# Patient Record
Sex: FEMALE | Race: WHITE | NOT HISPANIC OR LATINO | Employment: OTHER | URBAN - METROPOLITAN AREA
[De-identification: names, ages, dates, MRNs, and addresses within clinical notes are randomized per-mention and may not be internally consistent; named-entity substitution may affect disease eponyms.]

---

## 2023-10-03 ENCOUNTER — NURSING HOME VISIT (OUTPATIENT)
Dept: POST ACUTE CARE | Facility: EXTERNAL LOCATION | Age: 88
End: 2023-10-03
Payer: MEDICARE

## 2023-10-03 DIAGNOSIS — E03.9 HYPOTHYROIDISM (ACQUIRED): ICD-10-CM

## 2023-10-03 DIAGNOSIS — I10 ESSENTIAL HYPERTENSION: ICD-10-CM

## 2023-10-03 DIAGNOSIS — I50.32 CHRONIC HEART FAILURE WITH PRESERVED EJECTION FRACTION (HFPEF) (MULTI): Primary | ICD-10-CM

## 2023-10-03 DIAGNOSIS — K21.9 GASTROESOPHAGEAL REFLUX DISEASE WITHOUT ESOPHAGITIS: ICD-10-CM

## 2023-10-03 DIAGNOSIS — F41.9 ANXIETY: ICD-10-CM

## 2023-10-03 DIAGNOSIS — F03.90 DEMENTIA WITHOUT BEHAVIORAL DISTURBANCE (MULTI): ICD-10-CM

## 2023-10-03 DIAGNOSIS — E78.2 MIXED HYPERLIPIDEMIA: ICD-10-CM

## 2023-10-03 PROCEDURE — 99308 SBSQ NF CARE LOW MDM 20: CPT | Performed by: INTERNAL MEDICINE

## 2023-10-03 NOTE — LETTER
Patient: Jailyn Dai  : 1930    Encounter Date: 10/03/2023    Subjective  Patient ID: Jailyn Dai is a 89 y.o. female who is long term resident being seen and evaluated for multiple medical problems.    HPI She is c/o dry cough, deneis SOB    Review of Systems   Constitutional:  Negative for fever and unexpected weight change.   HENT:  Negative for sore throat.    Respiratory:  Positive for cough. Negative for shortness of breath.    Cardiovascular:  Negative for chest pain and palpitations.   Gastrointestinal:  Negative for abdominal pain, constipation, diarrhea, nausea and vomiting.   Genitourinary:  Negative for difficulty urinating and frequency.   Musculoskeletal:  Positive for arthralgias. Negative for back pain.   Skin:  Negative for rash.   Neurological:  Negative for dizziness, seizures and headaches.   Psychiatric/Behavioral:  Negative for agitation. The patient is not nervous/anxious.        Objective  There were no vitals taken for this visit.    Physical Exam  Vitals reviewed.   Constitutional:       General: She is not in acute distress.  HENT:      Mouth/Throat:      Mouth: Mucous membranes are moist.   Cardiovascular:      Rate and Rhythm: Regular rhythm.      Heart sounds: Normal heart sounds.   Pulmonary:      Breath sounds: Normal breath sounds. No rales.   Abdominal:      Palpations: Abdomen is soft.      Tenderness: There is no abdominal tenderness.   Musculoskeletal:         General: No tenderness.      Cervical back: Neck supple. No tenderness.      Right lower leg: Edema present.      Left lower leg: Edema present.   Skin:     General: Skin is warm.   Neurological:      General: No focal deficit present.      Mental Status: She is alert.   Psychiatric:         Behavior: Behavior normal.         Assessment/Plan         Goals    None           Electronically Signed By: Swetha Garcia MD   10/17/23  9:20 AM

## 2023-10-10 ENCOUNTER — NURSING HOME VISIT (OUTPATIENT)
Dept: POST ACUTE CARE | Facility: EXTERNAL LOCATION | Age: 88
End: 2023-10-10
Payer: MEDICARE

## 2023-10-10 DIAGNOSIS — I10 ESSENTIAL HYPERTENSION: ICD-10-CM

## 2023-10-10 DIAGNOSIS — Z86.79 HISTORY OF ATRIAL FIBRILLATION: ICD-10-CM

## 2023-10-10 DIAGNOSIS — I50.32 CHRONIC HEART FAILURE WITH PRESERVED EJECTION FRACTION (HFPEF) (MULTI): Primary | ICD-10-CM

## 2023-10-10 DIAGNOSIS — M15.9 PRIMARY OSTEOARTHRITIS INVOLVING MULTIPLE JOINTS: ICD-10-CM

## 2023-10-10 PROCEDURE — 99308 SBSQ NF CARE LOW MDM 20: CPT | Performed by: INTERNAL MEDICINE

## 2023-10-10 NOTE — LETTER
Patient: Jailyn Dai  : 1930    Encounter Date: 10/10/2023    Subjective  Patient ID: Jailyn Dai is a 89 y.o. female who is long term resident being seen and evaluated for multiple medical problems.    HPI     Review of Systems   Constitutional:  Negative for fever and unexpected weight change.   HENT:  Negative for sore throat.    Respiratory:  Negative for cough and shortness of breath.    Cardiovascular:  Negative for chest pain and palpitations.   Gastrointestinal:  Negative for abdominal pain, constipation, diarrhea, nausea and vomiting.   Genitourinary:  Negative for difficulty urinating and frequency.   Musculoskeletal:  Positive for arthralgias. Negative for back pain.   Skin:  Negative for rash.   Neurological:  Negative for dizziness, seizures and headaches.   Psychiatric/Behavioral:  Negative for agitation. The patient is not nervous/anxious.        Objective  There were no vitals taken for this visit.    Physical Exam  Cardiovascular:      Rate and Rhythm: Normal rate and regular rhythm.   Pulmonary:      Breath sounds: Normal breath sounds.   Abdominal:      General: Bowel sounds are normal.      Palpations: Abdomen is soft.   Musculoskeletal:         General: No swelling.   Neurological:      Mental Status: She is alert.         Assessment/Plan  Problem List Items Addressed This Visit       Chronic heart failure with preserved ejection fraction (HFpEF) (CMS/[facility]) - Primary    Essential hypertension     Will monitor BP         History of atrial fibrillation    Osteoarthritis     Continue fall precautions             Goals    None           Electronically Signed By: Swetha Garcia MD   23  6:52 AM

## 2023-10-14 PROBLEM — F41.9 ANXIETY: Status: ACTIVE | Noted: 2023-10-14

## 2023-10-17 PROBLEM — M54.16 LUMBAR RADICULOPATHY: Status: ACTIVE | Noted: 2023-10-17

## 2023-10-17 PROBLEM — E78.2 MIXED HYPERLIPIDEMIA: Status: ACTIVE | Noted: 2023-10-17

## 2023-10-17 PROBLEM — M87.052 AVASCULAR NECROSIS OF BONE OF LEFT HIP (MULTI): Status: ACTIVE | Noted: 2023-10-17

## 2023-10-17 PROBLEM — M19.90 OSTEOARTHRITIS: Status: ACTIVE | Noted: 2023-10-17

## 2023-10-17 PROBLEM — I25.10 ATHEROSCLEROTIC HEART DISEASE OF NATIVE CORONARY ARTERY WITHOUT ANGINA PECTORIS: Status: ACTIVE | Noted: 2023-10-17

## 2023-10-17 PROBLEM — R26.2 DISABILITY OF WALKING: Status: ACTIVE | Noted: 2023-10-17

## 2023-10-17 PROBLEM — K21.9 GASTROESOPHAGEAL REFLUX DISEASE WITHOUT ESOPHAGITIS: Status: ACTIVE | Noted: 2023-10-17

## 2023-10-17 PROBLEM — Z86.79 HISTORY OF ATRIAL FIBRILLATION: Status: ACTIVE | Noted: 2023-10-17

## 2023-10-17 PROBLEM — I50.32 CHRONIC HEART FAILURE WITH PRESERVED EJECTION FRACTION (HFPEF) (MULTI): Status: ACTIVE | Noted: 2023-10-17

## 2023-10-17 PROBLEM — F03.90 DEMENTIA WITHOUT BEHAVIORAL DISTURBANCE (MULTI): Status: ACTIVE | Noted: 2023-10-17

## 2023-10-17 PROBLEM — K22.70 BARRETT'S ESOPHAGUS: Status: ACTIVE | Noted: 2023-10-17

## 2023-10-17 PROBLEM — E03.9 HYPOTHYROIDISM (ACQUIRED): Status: ACTIVE | Noted: 2023-10-17

## 2023-10-17 PROBLEM — I10 ESSENTIAL HYPERTENSION: Status: ACTIVE | Noted: 2023-10-17

## 2023-10-17 ASSESSMENT — ENCOUNTER SYMPTOMS
CONSTIPATION: 0
DIZZINESS: 0
PALPITATIONS: 0
FEVER: 0
COUGH: 1
ARTHRALGIAS: 1
FREQUENCY: 0
NAUSEA: 0
VOMITING: 0
SEIZURES: 0
BACK PAIN: 0
SORE THROAT: 0
DIARRHEA: 0
ABDOMINAL PAIN: 0
NERVOUS/ANXIOUS: 0
DIFFICULTY URINATING: 0
UNEXPECTED WEIGHT CHANGE: 0
AGITATION: 0
HEADACHES: 0
SHORTNESS OF BREATH: 0

## 2023-10-17 NOTE — PROGRESS NOTES
Subjective   Patient ID: Jailyn Dai is a 89 y.o. female who is long term resident being seen and evaluated for multiple medical problems.    HPI She is c/o dry cough, deneis SOB    Review of Systems   Constitutional:  Negative for fever and unexpected weight change.   HENT:  Negative for sore throat.    Respiratory:  Positive for cough. Negative for shortness of breath.    Cardiovascular:  Negative for chest pain and palpitations.   Gastrointestinal:  Negative for abdominal pain, constipation, diarrhea, nausea and vomiting.   Genitourinary:  Negative for difficulty urinating and frequency.   Musculoskeletal:  Positive for arthralgias. Negative for back pain.   Skin:  Negative for rash.   Neurological:  Negative for dizziness, seizures and headaches.   Psychiatric/Behavioral:  Negative for agitation. The patient is not nervous/anxious.        Objective   There were no vitals taken for this visit.    Physical Exam  Vitals reviewed.   Constitutional:       General: She is not in acute distress.  HENT:      Mouth/Throat:      Mouth: Mucous membranes are moist.   Cardiovascular:      Rate and Rhythm: Regular rhythm.      Heart sounds: Normal heart sounds.   Pulmonary:      Breath sounds: Normal breath sounds. No rales.   Abdominal:      Palpations: Abdomen is soft.      Tenderness: There is no abdominal tenderness.   Musculoskeletal:         General: No tenderness.      Cervical back: Neck supple. No tenderness.      Right lower leg: Edema present.      Left lower leg: Edema present.   Skin:     General: Skin is warm.   Neurological:      General: No focal deficit present.      Mental Status: She is alert.   Psychiatric:         Behavior: Behavior normal.         Assessment/Plan          Goals    None

## 2023-10-31 ENCOUNTER — NURSING HOME VISIT (OUTPATIENT)
Dept: POST ACUTE CARE | Facility: EXTERNAL LOCATION | Age: 88
End: 2023-10-31
Payer: MEDICARE

## 2023-10-31 DIAGNOSIS — M54.16 LUMBAR RADICULOPATHY: ICD-10-CM

## 2023-10-31 DIAGNOSIS — I25.10 ATHEROSCLEROSIS OF NATIVE CORONARY ARTERY OF NATIVE HEART WITHOUT ANGINA PECTORIS: ICD-10-CM

## 2023-10-31 DIAGNOSIS — I50.32 CHRONIC HEART FAILURE WITH PRESERVED EJECTION FRACTION (HFPEF) (MULTI): Primary | ICD-10-CM

## 2023-10-31 PROCEDURE — 99309 SBSQ NF CARE MODERATE MDM 30: CPT | Performed by: INTERNAL MEDICINE

## 2023-10-31 NOTE — LETTER
Patient: Jailyn Dai  : 1930    Encounter Date: 10/31/2023    Subjective  Patient ID: Jailyn Dai is a 89 y.o. female who is long term resident being seen and evaluated for multiple medical problems.    She is alert, pleasant, she is noted to have increased edema of her legs.  Denies shortness of breath at rest but continues to have nonproductive cough.  She ambulates short distance with walker, limited due to bilateral hip pain.  She is compliant with medications         Review of Systems   Constitutional:  Negative for fever and unexpected weight change.   HENT:  Negative for sore throat.    Respiratory:  Positive for cough. Negative for shortness of breath.    Cardiovascular:  Positive for leg swelling. Negative for chest pain and palpitations.   Gastrointestinal:  Negative for abdominal pain, constipation, diarrhea, nausea and vomiting.   Genitourinary:  Negative for difficulty urinating and frequency.   Musculoskeletal:  Positive for arthralgias. Negative for back pain.   Skin:  Negative for rash.   Neurological:  Negative for dizziness, seizures and headaches.   Psychiatric/Behavioral:  Negative for agitation. The patient is not nervous/anxious.        Objective  There were no vitals taken for this visit.    Physical Exam  Musculoskeletal:      Right lower leg: Edema present.      Left lower leg: Edema present.         Assessment/Plan  Problem List Items Addressed This Visit       Atherosclerotic heart disease of native coronary artery without angina pectoris    Chronic heart failure with preserved ejection fraction (HFpEF) (CMS/[facility]) - Primary     Will increase the dose of furosemide and Aldactone, obtain echocardiogram and labs, continue compression stockings         RESOLVED: Lumbar radiculopathy        Goals    None           Electronically Signed By: Swetha Garcia MD   24  2:00 PM

## 2023-11-01 ASSESSMENT — ENCOUNTER SYMPTOMS
ABDOMINAL PAIN: 0
SEIZURES: 0
FEVER: 0
AGITATION: 0
HEADACHES: 0
DIFFICULTY URINATING: 0
COUGH: 0
CONSTIPATION: 0
BACK PAIN: 0
DIARRHEA: 0
NAUSEA: 0
FREQUENCY: 0
VOMITING: 0
NERVOUS/ANXIOUS: 0
SORE THROAT: 0
SHORTNESS OF BREATH: 0
UNEXPECTED WEIGHT CHANGE: 0
ARTHRALGIAS: 1
PALPITATIONS: 0
DIZZINESS: 0

## 2023-11-01 NOTE — PROGRESS NOTES
Subjective   Patient ID: Jailyn Dai is a 89 y.o. female who is long term resident being seen and evaluated for multiple medical problems.    HPI     Review of Systems   Constitutional:  Negative for fever and unexpected weight change.   HENT:  Negative for sore throat.    Respiratory:  Negative for cough and shortness of breath.    Cardiovascular:  Negative for chest pain and palpitations.   Gastrointestinal:  Negative for abdominal pain, constipation, diarrhea, nausea and vomiting.   Genitourinary:  Negative for difficulty urinating and frequency.   Musculoskeletal:  Positive for arthralgias. Negative for back pain.   Skin:  Negative for rash.   Neurological:  Negative for dizziness, seizures and headaches.   Psychiatric/Behavioral:  Negative for agitation. The patient is not nervous/anxious.        Objective   There were no vitals taken for this visit.    Physical Exam  Cardiovascular:      Rate and Rhythm: Normal rate and regular rhythm.   Pulmonary:      Breath sounds: Normal breath sounds.   Abdominal:      General: Bowel sounds are normal.      Palpations: Abdomen is soft.   Musculoskeletal:         General: No swelling.   Neurological:      Mental Status: She is alert.         Assessment/Plan   Problem List Items Addressed This Visit       Chronic heart failure with preserved ejection fraction (HFpEF) (CMS/[facility]) - Primary    Essential hypertension     Will monitor BP         History of atrial fibrillation    Osteoarthritis     Continue fall precautions             Goals    None

## 2023-11-07 ENCOUNTER — NURSING HOME VISIT (OUTPATIENT)
Dept: POST ACUTE CARE | Facility: EXTERNAL LOCATION | Age: 88
End: 2023-11-07
Payer: MEDICARE

## 2023-11-07 DIAGNOSIS — M15.9 PRIMARY OSTEOARTHRITIS INVOLVING MULTIPLE JOINTS: ICD-10-CM

## 2023-11-07 DIAGNOSIS — F03.90 DEMENTIA WITHOUT BEHAVIORAL DISTURBANCE (MULTI): ICD-10-CM

## 2023-11-07 DIAGNOSIS — R29.6 FREQUENT FALLS: ICD-10-CM

## 2023-11-07 DIAGNOSIS — F41.9 ANXIETY: ICD-10-CM

## 2023-11-07 DIAGNOSIS — I50.32 CHRONIC HEART FAILURE WITH PRESERVED EJECTION FRACTION (HFPEF) (MULTI): Primary | ICD-10-CM

## 2023-11-07 PROBLEM — R26.2 DISABILITY OF WALKING: Status: RESOLVED | Noted: 2023-10-17 | Resolved: 2023-11-07

## 2023-11-07 PROBLEM — M54.16 LUMBAR RADICULOPATHY: Status: RESOLVED | Noted: 2023-10-17 | Resolved: 2023-11-07

## 2023-11-07 PROBLEM — E78.2 MIXED HYPERLIPIDEMIA: Status: RESOLVED | Noted: 2023-10-17 | Resolved: 2023-11-07

## 2023-11-07 PROCEDURE — 99309 SBSQ NF CARE MODERATE MDM 30: CPT | Performed by: INTERNAL MEDICINE

## 2023-11-07 NOTE — LETTER
Patient: Jailyn Dai  : 1930    Encounter Date: 2023    Subjective  Patient ID: Jailyn Dai is a 89 y.o. female who is long term resident being seen and evaluated for multiple medical problems.    Alert, pleasant, forgetful.  She is complaining of increased leg edema and has a persistent cough, denies pain. No respiratory distress noted. Appetite fair. Participates in physical therapy, ambulates with walker           Review of Systems   Constitutional:  Negative for fever and unexpected weight change.   HENT:  Negative for sore throat.    Respiratory:  Negative for cough and shortness of breath.    Cardiovascular:  Negative for chest pain and palpitations.   Gastrointestinal:  Negative for abdominal pain, constipation, diarrhea, nausea and vomiting.   Genitourinary:  Negative for difficulty urinating and frequency.   Musculoskeletal:  Positive for arthralgias. Negative for back pain.   Skin:  Negative for rash.   Neurological:  Negative for dizziness, seizures and headaches.   Psychiatric/Behavioral:  Negative for agitation. The patient is not nervous/anxious.        Objective  There were no vitals taken for this visit.    Physical Exam  Cardiovascular:      Rate and Rhythm: Normal rate and regular rhythm.   Pulmonary:      Breath sounds: Normal breath sounds.   Abdominal:      General: Bowel sounds are normal.      Palpations: Abdomen is soft.   Musculoskeletal:         General: No swelling.      Right lower leg: Edema present.      Left lower leg: Edema present.   Neurological:      Mental Status: She is alert.         Assessment/Plan  Problem List Items Addressed This Visit       Anxiety - Primary    Chronic heart failure with preserved ejection fraction (HFpEF) (CMS/HCC)    Dementia without behavioral disturbance (CMS/HCC)    Osteoarthritis    Frequent falls        Goals    None     Will obtain echocardiogram, continue diuretics, check labs      Electronically Signed By: Swetha Garcia MD   24   7:14 PM

## 2023-11-13 PROBLEM — S43.006A DISLOCATION OF SHOULDER JOINT: Status: ACTIVE | Noted: 2023-11-13

## 2023-11-13 PROBLEM — R60.0 LOWER LEG EDEMA: Status: ACTIVE | Noted: 2023-11-13

## 2023-11-13 PROBLEM — G45.9 TRANSIENT CEREBRAL ISCHEMIA: Status: RESOLVED | Noted: 2023-11-13 | Resolved: 2023-11-13

## 2023-11-13 PROBLEM — R42 DIZZINESS: Status: ACTIVE | Noted: 2023-11-13

## 2023-11-13 PROBLEM — M16.0 PRIMARY OSTEOARTHRITIS OF BOTH HIPS: Status: ACTIVE | Noted: 2023-11-13
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
## 2023-11-13 PROBLEM — R29.6 FREQUENT FALLS: Status: ACTIVE | Noted: 2023-11-13

## 2023-11-13 PROBLEM — R42 DIZZINESS: Status: RESOLVED | Noted: 2023-11-13 | Resolved: 2023-11-13

## 2023-11-13 PROBLEM — S60.229A CONTUSION OF HAND: Status: ACTIVE | Noted: 2023-11-13

## 2023-11-13 PROBLEM — I50.9 HEART FAILURE (MULTI): Status: RESOLVED | Noted: 2023-11-13 | Resolved: 2023-11-13

## 2023-11-13 PROBLEM — R06.02 SHORTNESS OF BREATH: Status: RESOLVED | Noted: 2023-11-13 | Resolved: 2023-11-13

## 2023-11-13 PROBLEM — E53.8 VITAMIN B12 DEFICIENCY: Status: ACTIVE | Noted: 2023-11-13

## 2023-11-13 PROBLEM — R53.81 PHYSICAL DEBILITY: Status: ACTIVE | Noted: 2023-11-13

## 2023-11-13 PROBLEM — R42 LIGHTHEADEDNESS: Status: RESOLVED | Noted: 2023-11-13 | Resolved: 2023-11-13

## 2023-11-13 PROBLEM — E66.9 OBESITY: Status: ACTIVE | Noted: 2023-11-13

## 2023-11-13 PROBLEM — E87.1 HYPONATREMIA: Status: ACTIVE | Noted: 2023-11-13

## 2023-11-13 PROBLEM — I20.9 ANGINA PECTORIS (CMS-HCC): Status: RESOLVED | Noted: 2023-11-13 | Resolved: 2023-11-13

## 2023-11-13 PROBLEM — R11.2 NAUSEA & VOMITING: Status: ACTIVE | Noted: 2023-11-13

## 2023-11-13 PROBLEM — K64.8 INTERNAL HEMORRHOIDS: Status: ACTIVE | Noted: 2023-11-13

## 2023-11-13 PROBLEM — D12.6 BENIGN NEOPLASM OF LARGE BOWEL: Status: ACTIVE | Noted: 2023-11-13

## 2023-11-13 RX ORDER — LOSARTAN POTASSIUM 50 MG/1
50 TABLET ORAL EVERY 24 HOURS
COMMUNITY

## 2023-11-13 RX ORDER — ASPIRIN 81 MG/1
81 TABLET ORAL DAILY
COMMUNITY

## 2023-11-13 RX ORDER — OMEPRAZOLE 40 MG/1
40 CAPSULE, DELAYED RELEASE ORAL
COMMUNITY

## 2023-11-13 RX ORDER — LEVOTHYROXINE SODIUM 50 UG/1
50 TABLET ORAL EVERY 24 HOURS
COMMUNITY
Start: 2022-05-25

## 2023-11-13 RX ORDER — HYDROXYZINE HYDROCHLORIDE 25 MG/1
25 TABLET, FILM COATED ORAL EVERY 8 HOURS
COMMUNITY
End: 2023-11-15 | Stop reason: ALTCHOICE

## 2023-11-13 RX ORDER — POTASSIUM CHLORIDE 20 MEQ/1
20 TABLET, EXTENDED RELEASE ORAL EVERY 24 HOURS
COMMUNITY
End: 2023-11-15 | Stop reason: ALTCHOICE

## 2023-11-13 RX ORDER — ISOSORBIDE MONONITRATE 30 MG/1
30 TABLET, EXTENDED RELEASE ORAL EVERY 24 HOURS
COMMUNITY

## 2023-11-13 RX ORDER — PRAVASTATIN SODIUM 40 MG/1
40 TABLET ORAL EVERY 24 HOURS
COMMUNITY

## 2023-11-13 RX ORDER — FUROSEMIDE 40 MG/1
80 TABLET ORAL EVERY 24 HOURS
COMMUNITY
Start: 2020-06-18

## 2023-11-13 RX ORDER — SPIRONOLACTONE 25 MG/1
25 TABLET ORAL EVERY 24 HOURS
COMMUNITY
Start: 2022-08-29

## 2023-11-13 RX ORDER — DULOXETIN HYDROCHLORIDE 20 MG/1
1 CAPSULE, DELAYED RELEASE ORAL DAILY
COMMUNITY

## 2023-11-14 RX ORDER — LANOLIN ALCOHOL/MO/W.PET/CERES
1000 CREAM (GRAM) TOPICAL DAILY
COMMUNITY
End: 2023-11-15 | Stop reason: ALTCHOICE

## 2023-11-14 RX ORDER — LUTEIN 6 MG
1 TABLET ORAL DAILY
COMMUNITY
Start: 2009-10-05

## 2023-11-14 RX ORDER — VIT C/E/ZN/COPPR/LUTEIN/ZEAXAN 250MG-90MG
25 CAPSULE ORAL DAILY
COMMUNITY
Start: 2012-06-11 | End: 2023-11-15 | Stop reason: ALTCHOICE

## 2023-11-14 RX ORDER — BISACODYL 5 MG
5 TABLET, DELAYED RELEASE (ENTERIC COATED) ORAL EVERY 12 HOURS
COMMUNITY
End: 2023-11-15 | Stop reason: ALTCHOICE

## 2023-11-15 ENCOUNTER — OFFICE VISIT (OUTPATIENT)
Dept: CARDIOLOGY | Facility: CLINIC | Age: 88
End: 2023-11-15
Payer: MEDICARE

## 2023-11-15 VITALS
DIASTOLIC BLOOD PRESSURE: 66 MMHG | SYSTOLIC BLOOD PRESSURE: 137 MMHG | RESPIRATION RATE: 18 BRPM | HEIGHT: 61 IN | TEMPERATURE: 98.6 F | BODY MASS INDEX: 36.06 KG/M2 | HEART RATE: 74 BPM | WEIGHT: 191 LBS

## 2023-11-15 DIAGNOSIS — Z86.79 HISTORY OF ATRIAL FIBRILLATION: ICD-10-CM

## 2023-11-15 DIAGNOSIS — I50.32 CHRONIC DIASTOLIC HEART FAILURE (MULTI): ICD-10-CM

## 2023-11-15 DIAGNOSIS — I10 ESSENTIAL HYPERTENSION: ICD-10-CM

## 2023-11-15 DIAGNOSIS — I49.9 ARRHYTHMIA: Primary | ICD-10-CM

## 2023-11-15 DIAGNOSIS — I25.10 ATHEROSCLEROSIS OF NATIVE CORONARY ARTERY OF NATIVE HEART WITHOUT ANGINA PECTORIS: ICD-10-CM

## 2023-11-15 DIAGNOSIS — E78.2 MIXED HYPERLIPIDEMIA: ICD-10-CM

## 2023-11-15 PROCEDURE — 3078F DIAST BP <80 MM HG: CPT | Performed by: INTERNAL MEDICINE

## 2023-11-15 PROCEDURE — 99214 OFFICE O/P EST MOD 30 MIN: CPT | Performed by: INTERNAL MEDICINE

## 2023-11-15 PROCEDURE — 3075F SYST BP GE 130 - 139MM HG: CPT | Performed by: INTERNAL MEDICINE

## 2023-11-15 PROCEDURE — 1036F TOBACCO NON-USER: CPT | Performed by: INTERNAL MEDICINE

## 2023-11-15 PROCEDURE — 1159F MED LIST DOCD IN RCRD: CPT | Performed by: INTERNAL MEDICINE

## 2023-11-15 PROCEDURE — 93000 ELECTROCARDIOGRAM COMPLETE: CPT | Performed by: INTERNAL MEDICINE

## 2023-11-15 PROCEDURE — 1126F AMNT PAIN NOTED NONE PRSNT: CPT | Performed by: INTERNAL MEDICINE

## 2023-11-15 RX ORDER — DOCUSATE SODIUM 100 MG/1
100 CAPSULE, LIQUID FILLED ORAL DAILY
COMMUNITY

## 2023-11-15 RX ORDER — VIT C/E/ZN/COPPR/LUTEIN/ZEAXAN 250MG-90MG
50 CAPSULE ORAL DAILY
COMMUNITY

## 2023-11-15 RX ORDER — LANOLIN ALCOHOL/MO/W.PET/CERES
1000 CREAM (GRAM) TOPICAL DAILY
COMMUNITY

## 2023-11-15 ASSESSMENT — PAIN SCALES - GENERAL: PAINLEVEL: 0-NO PAIN

## 2023-11-15 ASSESSMENT — PATIENT HEALTH QUESTIONNAIRE - PHQ9
1. LITTLE INTEREST OR PLEASURE IN DOING THINGS: NOT AT ALL
SUM OF ALL RESPONSES TO PHQ9 QUESTIONS 1 AND 2: 0
2. FEELING DOWN, DEPRESSED OR HOPELESS: NOT AT ALL

## 2023-11-15 NOTE — PROGRESS NOTES
History of present illness:  This is a very pleasant 89-year-old functional female patient he follow-up in my office on history of heart failure diastolic dysfunction. Patient is reasonably functional had a stress test last year showed no ischemia ejection fraction preserved on the 2 D echo mild valvular aortic calcification..Patient currently at the nursing home.  Returns to my office for follow-up.  Saw Dr. Bose who increased her furosemide to 80 mg oral daily due to worsening lower extremity edema.    Past Medical History:   Diagnosis Date    Angina pectoris (CMS/[facility]) 11/13/2023    Anxiety     Messina's esophagus     CAD (coronary artery disease)     CHF (congestive heart failure) (CMS/[facility])     Dementia (CMS/[facility])     Disability of walking 10/17/2023    Dizziness 11/13/2023    Essential hypertension     GERD (gastroesophageal reflux disease)     Lightheadedness 11/13/2023    Lumbar radiculopathy 10/17/2023    Mixed hyperlipidemia 10/17/2023    Shortness of breath 11/13/2023       Past Surgical History:   Procedure Laterality Date    TOTAL HIP ARTHROPLASTY Right        Allergies   Allergen Reactions    Meclizine Hcl Dizziness        reports that she has never smoked. She has never been exposed to tobacco smoke. She has never used smokeless tobacco. She reports that she does not drink alcohol and does not use drugs.    No family history on file.    Patient's Medications   New Prescriptions    No medications on file   Previous Medications    ASPIRIN (CALLY LOW DOSE ASPIRIN) 81 MG EC TABLET    Take 1 tablet (81 mg) by mouth once daily.    CHOLECALCIFEROL (VITAMIN D-3) 25 MCG (1000 UT) CAPSULE    Take 2 capsules (50 mcg) by mouth once daily.    CYANOCOBALAMIN (VITAMIN B-12) 1,000 MCG TABLET    Take 1 tablet (1,000 mcg) by mouth once daily.    DOCUSATE SODIUM (COLACE) 100 MG CAPSULE    Take 1 capsule (100 mg) by mouth once daily.    DULOXETINE (CYMBALTA) 20 MG DR CAPSULE    Take 1 capsule (20 mg) by mouth once daily.     FLUTICASONE-UMECLIDIN-VILANTER (TRELEGY-ELLIPTA) 100-62.5-25 MCG BLISTER WITH DEVICE    Inhale 1 puff once daily.    FUROSEMIDE (LASIX) 40 MG TABLET    Take 1 tablet (40 mg) by mouth once every 24 hours.    ISOSORBIDE MONONITRATE ER (IMDUR) 30 MG 24 HR TABLET    Take 1 tablet (30 mg) by mouth once every 24 hours.    LEVOTHYROXINE (EUTHYROX) 50 MCG TABLET    Take 1 tablet (50 mcg) by mouth once every 24 hours.    LOSARTAN (COZAAR) 50 MG TABLET    Take 1 tablet (50 mg) by mouth once every 24 hours.    OMEPRAZOLE (PRILOSEC) 40 MG DR CAPSULE    Take 1 capsule (40 mg) by mouth once daily in the morning. Take before meals.    PRAVASTATIN (PRAVACHOL) 40 MG TABLET    Take 1 tablet (40 mg) by mouth once every 24 hours.    SPIRONOLACTONE (ALDACTONE) 25 MG TABLET    Take 1 tablet (25 mg) by mouth once every 24 hours.    VIT A/VIT C/VIT E/ZINC/COPPER (ICAPS AREDS ORAL)    Take 1 capsule by mouth once daily.    VITAMIN E MIXED 400 UNIT TABLET    Take 1 tablet by mouth once daily.   Modified Medications    No medications on file   Discontinued Medications    BISACODYL (DULCOLAX, BISACODYL,) 5 MG EC TABLET    Take 1 tablet (5 mg) by mouth every 12 hours.    CHOLECALCIFEROL (VITAMIN D-3) 25 MCG (1000 UT) CAPSULE    Take 1 capsule (25 mcg) by mouth once daily.    CYANOCOBALAMIN (VITAMIN B-12) 1,000 MCG TABLET    Take 1 tablet (1,000 mcg) by mouth once daily.    HYDROXYZINE HCL (ATARAX) 25 MG TABLET    Take 1 tablet (25 mg) by mouth every 8 hours.    POTASSIUM CHLORIDE CR 20 MEQ ER TABLET    Take 1 tablet (20 mEq) by mouth once every 24 hours.       Objective   Physical Exam  General: Patient in no acute distress   HEENT: Atraumatic normocephalic.  Neck: Supple, jugular venous pressure within normal limit.  No bruits  Lungs: Clear to auscultation bilaterally  Cardiovascular: Regular rate and rhythm, normal heart sounds, no murmurs rubs or gallops  Abdomen: Soft nontender nondistended.  Normal bowel sounds.  Extremities: Warm to  touch, no edema.      Lab Review   No visits with results within 2 Month(s) from this visit.   Latest known visit with results is:   [facility] Encounter on 11/28/2022   Component Date Value    Glucose 11/28/2022 117 (H)     Urea Nitrogen 11/28/2022 18     Creatinine 11/28/2022 1.0     Urea Nitrogen/Creatinine* 11/28/2022 18.0     Sodium 11/28/2022 135     Potassium 11/28/2022 4.8     Chloride 11/28/2022 96 (L)     Bicarbonate 11/28/2022 23 (L)     Anion Gap 11/28/2022 16     Calcium 11/28/2022 9.0     ESTIMATED GFR 11/28/2022 53     T3, Total 11/28/2022 100     Free T4 11/28/2022 1.2     Thyroid Stimulating Horm* 11/28/2022 10.07 (H)         Assessment/Plan   Patient Active Problem List   Diagnosis    Anxiety    Atherosclerotic heart disease of native coronary artery without angina pectoris    Avascular necrosis of bone of left hip (CMS/HCC)    Messina's esophagus    Chronic heart failure with preserved ejection fraction (HFpEF) (CMS/HCC)    Dementia without behavioral disturbance (CMS/HCC)    Essential hypertension    Gastroesophageal reflux disease without esophagitis    History of atrial fibrillation    Hypothyroidism (acquired)    Osteoarthritis    Benign neoplasm of large bowel    Dizziness    Contusion of hand    Dislocation of shoulder joint    Fall    Frequent falls    Hyponatremia    Internal hemorrhoids    Lower leg edema    Nausea & vomiting    Obesity    Primary osteoarthritis of both hips    Physical debility    Vitamin B12 deficiency      This is a very pleasant 89-year-old functional female patient he follow-up in my office on history of heart failure diastolic dysfunction. Patient is reasonably functional had a stress test last year showed no ischemia ejection fraction preserved on the 2 D echo mild valvular aortic calcification..Patient currently at the nursing home.  Returns to my office for follow-up.  Saw Dr. Bose who increased her furosemide to 80 mg oral daily due to worsening lower extremity  edema.  Denies having any shortness of breath or chest pain.  EKG showing sinus arrhythmia nonspecific ST-T wave abnormalities.  She stable from my standpoint.  Conservative management overall in her care.  I do anticipate doing any repeated 2D echo or stress test since her last work-up was decent recently.  Okay with continuing Lasix 80 mg oral daily recommend to raise her legs twice a day in bed discussed with her in length continue compression stocking.  Stable from my standpoint follow-up in 6 months    Cassandra Camarillo MD

## 2023-12-05 ENCOUNTER — NURSING HOME VISIT (OUTPATIENT)
Dept: POST ACUTE CARE | Facility: EXTERNAL LOCATION | Age: 88
End: 2023-12-05
Payer: MEDICARE

## 2023-12-05 DIAGNOSIS — M15.9 PRIMARY OSTEOARTHRITIS INVOLVING MULTIPLE JOINTS: ICD-10-CM

## 2023-12-05 DIAGNOSIS — I50.32 CHRONIC HEART FAILURE WITH PRESERVED EJECTION FRACTION (HFPEF) (MULTI): Primary | ICD-10-CM

## 2023-12-05 DIAGNOSIS — I10 ESSENTIAL HYPERTENSION: ICD-10-CM

## 2023-12-05 DIAGNOSIS — F03.90 DEMENTIA WITHOUT BEHAVIORAL DISTURBANCE (MULTI): ICD-10-CM

## 2023-12-05 DIAGNOSIS — F41.9 ANXIETY: ICD-10-CM

## 2023-12-05 PROCEDURE — 99308 SBSQ NF CARE LOW MDM 20: CPT | Performed by: INTERNAL MEDICINE

## 2023-12-12 ASSESSMENT — ENCOUNTER SYMPTOMS
VOMITING: 0
NERVOUS/ANXIOUS: 0
UNEXPECTED WEIGHT CHANGE: 0
HEADACHES: 0
NAUSEA: 0
FREQUENCY: 0
FEVER: 0
SHORTNESS OF BREATH: 0
SEIZURES: 0
SORE THROAT: 0
CONSTIPATION: 0
COUGH: 0
PALPITATIONS: 0
DIFFICULTY URINATING: 0
ARTHRALGIAS: 1
ABDOMINAL PAIN: 0
BACK PAIN: 0
AGITATION: 0
DIARRHEA: 0
DIZZINESS: 0

## 2023-12-12 NOTE — PROGRESS NOTES
Interventional Cardiology Subjective   Patient ID: Jailyn Dai is a 89 y.o. female who is long term resident being seen and evaluated for multiple medical problems.    Alert, pleasant, forgetful.  She is complaining of increased leg edema and has a persistent cough, denies pain. No respiratory distress noted. Appetite fair. Participates in physical therapy, ambulates with walker           Review of Systems   Constitutional:  Negative for fever and unexpected weight change.   HENT:  Negative for sore throat.    Respiratory:  Negative for cough and shortness of breath.    Cardiovascular:  Negative for chest pain and palpitations.   Gastrointestinal:  Negative for abdominal pain, constipation, diarrhea, nausea and vomiting.   Genitourinary:  Negative for difficulty urinating and frequency.   Musculoskeletal:  Positive for arthralgias. Negative for back pain.   Skin:  Negative for rash.   Neurological:  Negative for dizziness, seizures and headaches.   Psychiatric/Behavioral:  Negative for agitation. The patient is not nervous/anxious.        Objective   There were no vitals taken for this visit.    Physical Exam  Cardiovascular:      Rate and Rhythm: Normal rate and regular rhythm.   Pulmonary:      Breath sounds: Normal breath sounds.   Abdominal:      General: Bowel sounds are normal.      Palpations: Abdomen is soft.   Musculoskeletal:         General: No swelling.      Right lower leg: Edema present.      Left lower leg: Edema present.   Neurological:      Mental Status: She is alert.         Assessment/Plan   Problem List Items Addressed This Visit       Anxiety - Primary    Chronic heart failure with preserved ejection fraction (HFpEF) (CMS/[facility])    Dementia without behavioral disturbance (CMS/HCC)    Osteoarthritis    Frequent falls        Goals    None     Will obtain echocardiogram, continue diuretics, check labs

## 2023-12-16 ENCOUNTER — LAB REQUISITION (OUTPATIENT)
Dept: LAB | Facility: HOSPITAL | Age: 88
End: 2023-12-16
Payer: MEDICARE

## 2023-12-16 DIAGNOSIS — G30.9 ALZHEIMER'S DISEASE, UNSPECIFIED (CODE) (MULTI): ICD-10-CM

## 2023-12-16 LAB
ANION GAP SERPL CALC-SCNC: 15 MMOL/L (ref 10–20)
BASOPHILS # BLD AUTO: 0.05 X10*3/UL (ref 0–0.1)
BASOPHILS NFR BLD AUTO: 0.7 %
BUN SERPL-MCNC: 29 MG/DL (ref 6–23)
CALCIUM SERPL-MCNC: 8.8 MG/DL (ref 8.6–10.3)
CHLORIDE SERPL-SCNC: 97 MMOL/L (ref 98–107)
CO2 SERPL-SCNC: 27 MMOL/L (ref 21–32)
CREAT SERPL-MCNC: 1.25 MG/DL (ref 0.5–1.05)
EOSINOPHIL # BLD AUTO: 0.36 X10*3/UL (ref 0–0.4)
EOSINOPHIL NFR BLD AUTO: 5 %
ERYTHROCYTE [DISTWIDTH] IN BLOOD BY AUTOMATED COUNT: 13.6 % (ref 11.5–14.5)
GFR SERPL CREATININE-BSD FRML MDRD: 40 ML/MIN/1.73M*2
GLUCOSE SERPL-MCNC: 94 MG/DL (ref 74–99)
HCT VFR BLD AUTO: 39 % (ref 36–46)
HGB BLD-MCNC: 12.8 G/DL (ref 12–16)
IMM GRANULOCYTES # BLD AUTO: 0.02 X10*3/UL (ref 0–0.5)
IMM GRANULOCYTES NFR BLD AUTO: 0.3 % (ref 0–0.9)
LYMPHOCYTES # BLD AUTO: 1.28 X10*3/UL (ref 0.8–3)
LYMPHOCYTES NFR BLD AUTO: 17.8 %
MCH RBC QN AUTO: 32.8 PG (ref 26–34)
MCHC RBC AUTO-ENTMCNC: 32.8 G/DL (ref 32–36)
MCV RBC AUTO: 100 FL (ref 80–100)
MONOCYTES # BLD AUTO: 1.24 X10*3/UL (ref 0.05–0.8)
MONOCYTES NFR BLD AUTO: 17.2 %
NEUTROPHILS # BLD AUTO: 4.24 X10*3/UL (ref 1.6–5.5)
NEUTROPHILS NFR BLD AUTO: 59 %
NRBC BLD-RTO: 0 /100 WBCS (ref 0–0)
PLATELET # BLD AUTO: 178 X10*3/UL (ref 150–450)
POTASSIUM SERPL-SCNC: 4.7 MMOL/L (ref 3.5–5.3)
RBC # BLD AUTO: 3.9 X10*6/UL (ref 4–5.2)
SODIUM SERPL-SCNC: 134 MMOL/L (ref 136–145)
WBC # BLD AUTO: 7.2 X10*3/UL (ref 4.4–11.3)

## 2023-12-16 PROCEDURE — 80048 BASIC METABOLIC PNL TOTAL CA: CPT

## 2023-12-16 PROCEDURE — 85025 COMPLETE CBC W/AUTO DIFF WBC: CPT

## 2023-12-27 ASSESSMENT — ENCOUNTER SYMPTOMS
BACK PAIN: 0
DIFFICULTY URINATING: 0
COUGH: 0
VOMITING: 0
DIARRHEA: 0
SHORTNESS OF BREATH: 0
FREQUENCY: 0
AGITATION: 0
DIZZINESS: 0
UNEXPECTED WEIGHT CHANGE: 0
SEIZURES: 0
NERVOUS/ANXIOUS: 0
SORE THROAT: 0
ARTHRALGIAS: 1
ABDOMINAL PAIN: 0
PALPITATIONS: 0
HEADACHES: 0
FEVER: 0
NAUSEA: 0
CONSTIPATION: 0

## 2023-12-27 NOTE — PROGRESS NOTES
Subjective   Patient ID: Jailyn Dai is a 89 y.o. female who is long term resident being seen and evaluated for multiple medical problems.    Alert, pleasant, forgetful denies pain.  She continues to have a productive cough.  No respiratory distress noted. Appetite fair.  Chest x-ray was clear           Review of Systems   Constitutional:  Negative for fever and unexpected weight change.   HENT:  Negative for sore throat.    Respiratory:  Negative for cough and shortness of breath.    Cardiovascular:  Negative for chest pain and palpitations.   Gastrointestinal:  Negative for abdominal pain, constipation, diarrhea, nausea and vomiting.   Genitourinary:  Negative for difficulty urinating and frequency.   Musculoskeletal:  Positive for arthralgias. Negative for back pain.   Skin:  Negative for rash.   Neurological:  Negative for dizziness, seizures and headaches.   Psychiatric/Behavioral:  Negative for agitation. The patient is not nervous/anxious.        Objective   There were no vitals taken for this visit.    Physical Exam  Cardiovascular:      Rate and Rhythm: Normal rate and regular rhythm.   Pulmonary:      Breath sounds: Normal breath sounds.   Abdominal:      General: Bowel sounds are normal.      Palpations: Abdomen is soft.   Musculoskeletal:         General: No swelling.   Neurological:      Mental Status: She is alert.         Assessment/Plan   Problem List Items Addressed This Visit       Anxiety    Chronic heart failure with preserved ejection fraction (HFpEF) (CMS/HCC) - Primary    Dementia without behavioral disturbance (CMS/[facility])    Essential hypertension     Will monitor BP           Osteoarthritis        Goals    None     Cough probably due to CHF, will continue diuretics and monitor.

## 2024-01-01 ASSESSMENT — ENCOUNTER SYMPTOMS
SEIZURES: 0
PALPITATIONS: 0
SORE THROAT: 0
COUGH: 1
DIZZINESS: 0
AGITATION: 0
SHORTNESS OF BREATH: 0
HEADACHES: 0
BACK PAIN: 0
UNEXPECTED WEIGHT CHANGE: 0
NAUSEA: 0
DIARRHEA: 0
DIFFICULTY URINATING: 0
FREQUENCY: 0
ABDOMINAL PAIN: 0
VOMITING: 0
NERVOUS/ANXIOUS: 0
CONSTIPATION: 0
FEVER: 0
ARTHRALGIAS: 1

## 2024-01-01 NOTE — PROGRESS NOTES
Subjective   Patient ID: Jailyn Dai is a 89 y.o. female who is long term resident being seen and evaluated for multiple medical problems.    She is alert, pleasant, she is noted to have increased edema of her legs.  Denies shortness of breath at rest but continues to have nonproductive cough.  She ambulates short distance with walker, limited due to bilateral hip pain.  She is compliant with medications         Review of Systems   Constitutional:  Negative for fever and unexpected weight change.   HENT:  Negative for sore throat.    Respiratory:  Positive for cough. Negative for shortness of breath.    Cardiovascular:  Positive for leg swelling. Negative for chest pain and palpitations.   Gastrointestinal:  Negative for abdominal pain, constipation, diarrhea, nausea and vomiting.   Genitourinary:  Negative for difficulty urinating and frequency.   Musculoskeletal:  Positive for arthralgias. Negative for back pain.   Skin:  Negative for rash.   Neurological:  Negative for dizziness, seizures and headaches.   Psychiatric/Behavioral:  Negative for agitation. The patient is not nervous/anxious.        Objective   There were no vitals taken for this visit.    Physical Exam  Musculoskeletal:      Right lower leg: Edema present.      Left lower leg: Edema present.         Assessment/Plan   Problem List Items Addressed This Visit       Atherosclerotic heart disease of native coronary artery without angina pectoris    Chronic heart failure with preserved ejection fraction (HFpEF) (CMS/[facility]) - Primary     Will increase the dose of furosemide and Aldactone, obtain echocardiogram and labs, continue compression stockings         RESOLVED: Lumbar radiculopathy        Goals    None

## 2024-01-01 NOTE — ASSESSMENT & PLAN NOTE
Will increase the dose of furosemide and Aldactone, obtain echocardiogram and labs, continue compression stockings

## 2024-01-16 ENCOUNTER — NURSING HOME VISIT (OUTPATIENT)
Dept: POST ACUTE CARE | Facility: EXTERNAL LOCATION | Age: 89
End: 2024-01-16
Payer: MEDICARE

## 2024-01-16 DIAGNOSIS — K21.9 GASTROESOPHAGEAL REFLUX DISEASE WITHOUT ESOPHAGITIS: ICD-10-CM

## 2024-01-16 DIAGNOSIS — I25.10 ATHEROSCLEROSIS OF NATIVE CORONARY ARTERY OF NATIVE HEART WITHOUT ANGINA PECTORIS: ICD-10-CM

## 2024-01-16 DIAGNOSIS — F41.9 ANXIETY: ICD-10-CM

## 2024-01-16 DIAGNOSIS — I10 ESSENTIAL HYPERTENSION: ICD-10-CM

## 2024-01-16 DIAGNOSIS — F03.90 DEMENTIA WITHOUT BEHAVIORAL DISTURBANCE (MULTI): ICD-10-CM

## 2024-01-16 DIAGNOSIS — M15.9 PRIMARY OSTEOARTHRITIS INVOLVING MULTIPLE JOINTS: Primary | ICD-10-CM

## 2024-01-16 PROCEDURE — 99308 SBSQ NF CARE LOW MDM 20: CPT | Performed by: INTERNAL MEDICINE

## 2024-01-16 NOTE — LETTER
Patient: Jailyn Dai  : 1930    Encounter Date: 2024    Subjective  Patient ID: Jailyn Dai is a 89 y.o. female who is long term resident being seen and evaluated for multiple medical problems.    Alert, pleasant, forgetful, no respiratory distress noted. Appetite okay.  She is complaining of bilateral hip pain.  Ambulates short distances with walker.           Review of Systems   Constitutional:  Negative for fever and unexpected weight change.   HENT:  Negative for sore throat.    Respiratory:  Negative for cough and shortness of breath.    Cardiovascular:  Negative for chest pain and palpitations.   Gastrointestinal:  Negative for abdominal pain, constipation, diarrhea, nausea and vomiting.   Genitourinary:  Negative for difficulty urinating and frequency.   Musculoskeletal:  Positive for arthralgias. Negative for back pain.   Skin:  Negative for rash.   Neurological:  Negative for dizziness, seizures and headaches.   Psychiatric/Behavioral:  Negative for agitation. The patient is not nervous/anxious.        Objective  There were no vitals taken for this visit.    Physical Exam  Cardiovascular:      Rate and Rhythm: Normal rate and regular rhythm.   Pulmonary:      Breath sounds: Normal breath sounds.   Abdominal:      General: Bowel sounds are normal.      Palpations: Abdomen is soft.   Musculoskeletal:         General: No swelling.   Neurological:      Mental Status: She is alert.         Assessment/Plan  Problem List Items Addressed This Visit       Anxiety    Atherosclerotic heart disease of native coronary artery without angina pectoris    Dementia without behavioral disturbance (CMS/[facility])    Essential hypertension     Will monitor BP         Gastroesophageal reflux disease without esophagitis    Osteoarthritis - Primary        Goals    None     Will continue fall precautions, check labs      Electronically Signed By: Swetha Garcia MD   3/17/24 10:01 PM

## 2024-02-05 DIAGNOSIS — M15.9 PRIMARY OSTEOARTHRITIS INVOLVING MULTIPLE JOINTS: Primary | ICD-10-CM

## 2024-02-05 RX ORDER — TRAMADOL HYDROCHLORIDE 50 MG/1
50 TABLET ORAL DAILY
Qty: 30 TABLET | Refills: 5 | Status: SHIPPED | OUTPATIENT
Start: 2024-02-05 | End: 2024-03-06

## 2024-02-05 ASSESSMENT — ENCOUNTER SYMPTOMS
PALPITATIONS: 0
SORE THROAT: 0
COUGH: 0
NAUSEA: 0
BACK PAIN: 0
NERVOUS/ANXIOUS: 0
ARTHRALGIAS: 1
CONSTIPATION: 0
SEIZURES: 0
UNEXPECTED WEIGHT CHANGE: 0
ABDOMINAL PAIN: 0
AGITATION: 0
DIARRHEA: 0
SHORTNESS OF BREATH: 0
HEADACHES: 0
DIZZINESS: 0
FEVER: 0
VOMITING: 0
FREQUENCY: 0
DIFFICULTY URINATING: 0

## 2024-02-05 NOTE — PROGRESS NOTES
Subjective   Patient ID: Jailyn Dai is a 89 y.o. female who is long term resident being seen and evaluated for multiple medical problems.    Alert, pleasant, forgetful, no respiratory distress noted. Appetite okay.  She is complaining of bilateral hip pain.  Ambulates short distances with walker.           Review of Systems   Constitutional:  Negative for fever and unexpected weight change.   HENT:  Negative for sore throat.    Respiratory:  Negative for cough and shortness of breath.    Cardiovascular:  Negative for chest pain and palpitations.   Gastrointestinal:  Negative for abdominal pain, constipation, diarrhea, nausea and vomiting.   Genitourinary:  Negative for difficulty urinating and frequency.   Musculoskeletal:  Positive for arthralgias. Negative for back pain.   Skin:  Negative for rash.   Neurological:  Negative for dizziness, seizures and headaches.   Psychiatric/Behavioral:  Negative for agitation. The patient is not nervous/anxious.        Objective   There were no vitals taken for this visit.    Physical Exam  Cardiovascular:      Rate and Rhythm: Normal rate and regular rhythm.   Pulmonary:      Breath sounds: Normal breath sounds.   Abdominal:      General: Bowel sounds are normal.      Palpations: Abdomen is soft.   Musculoskeletal:         General: No swelling.   Neurological:      Mental Status: She is alert.         Assessment/Plan   Problem List Items Addressed This Visit       Anxiety    Atherosclerotic heart disease of native coronary artery without angina pectoris    Dementia without behavioral disturbance (CMS/[facility])    Essential hypertension     Will monitor BP         Gastroesophageal reflux disease without esophagitis    Osteoarthritis - Primary        Goals    None     Will continue fall precautions, check labs

## 2024-02-06 ENCOUNTER — NURSING HOME VISIT (OUTPATIENT)
Dept: POST ACUTE CARE | Facility: EXTERNAL LOCATION | Age: 89
End: 2024-02-06
Payer: MEDICARE

## 2024-02-06 DIAGNOSIS — I25.10 ATHEROSCLEROSIS OF NATIVE CORONARY ARTERY OF NATIVE HEART WITHOUT ANGINA PECTORIS: Primary | ICD-10-CM

## 2024-02-06 DIAGNOSIS — M54.16 LUMBAR RADICULOPATHY: ICD-10-CM

## 2024-02-06 DIAGNOSIS — K21.9 GASTROESOPHAGEAL REFLUX DISEASE WITHOUT ESOPHAGITIS: ICD-10-CM

## 2024-02-06 DIAGNOSIS — I10 ESSENTIAL HYPERTENSION: ICD-10-CM

## 2024-02-06 DIAGNOSIS — M15.9 PRIMARY OSTEOARTHRITIS INVOLVING MULTIPLE JOINTS: ICD-10-CM

## 2024-02-06 DIAGNOSIS — I50.32 CHRONIC HEART FAILURE WITH PRESERVED EJECTION FRACTION (HFPEF) (MULTI): ICD-10-CM

## 2024-02-06 DIAGNOSIS — R42 DIZZINESS: ICD-10-CM

## 2024-02-06 PROCEDURE — 99308 SBSQ NF CARE LOW MDM 20: CPT | Performed by: INTERNAL MEDICINE

## 2024-02-06 NOTE — LETTER
Patient: Jailyn Dai  : 1930    Encounter Date: 2024    Subjective  Patient ID: Jailyn Dai is a 89 y.o. female who is long term resident being seen and evaluated for multiple medical problems.    Alert, pleasant, forgetful. Denies pain, no respiratory distress noted. Ambulates short distances with walker. Denies recent falls. Appetite fair           Review of Systems   Constitutional:  Negative for fever and unexpected weight change.   HENT:  Negative for sore throat.    Respiratory:  Negative for cough and shortness of breath.    Cardiovascular:  Negative for chest pain and palpitations.   Gastrointestinal:  Negative for abdominal pain, constipation, diarrhea, nausea and vomiting.   Genitourinary:  Negative for difficulty urinating and frequency.   Musculoskeletal:  Positive for arthralgias. Negative for back pain.   Skin:  Negative for rash.   Neurological:  Negative for dizziness, seizures and headaches.   Psychiatric/Behavioral:  Negative for agitation. The patient is not nervous/anxious.        Objective  There were no vitals taken for this visit.    Physical Exam  Cardiovascular:      Rate and Rhythm: Normal rate and regular rhythm.   Pulmonary:      Breath sounds: Normal breath sounds.   Abdominal:      General: Bowel sounds are normal.      Palpations: Abdomen is soft.   Musculoskeletal:         General: No swelling.   Neurological:      Mental Status: She is alert.         Assessment/Plan  Problem List Items Addressed This Visit       Atherosclerotic heart disease of native coronary artery without angina pectoris - Primary    Chronic heart failure with preserved ejection fraction (HFpEF) (CMS/[facility])    Essential hypertension     Will monitor BP         Gastroesophageal reflux disease without esophagitis    Osteoarthritis    Dizziness     Other Visit Diagnoses       Lumbar radiculopathy                 Goals    None     Continue supportive care, analgesics as needed      Electronically Signed By:  Swetha Garcia MD   3/17/24 10:03 PM

## 2024-02-20 ASSESSMENT — ENCOUNTER SYMPTOMS
VOMITING: 0
PALPITATIONS: 0
SHORTNESS OF BREATH: 0
CONSTIPATION: 0
DIARRHEA: 0
DIFFICULTY URINATING: 0
AGITATION: 0
NERVOUS/ANXIOUS: 0
ARTHRALGIAS: 1
SEIZURES: 0
HEADACHES: 0
DIZZINESS: 0
FEVER: 0
FREQUENCY: 0
UNEXPECTED WEIGHT CHANGE: 0
BACK PAIN: 0
ABDOMINAL PAIN: 0
SORE THROAT: 0
COUGH: 0
NAUSEA: 0

## 2024-02-20 NOTE — PROGRESS NOTES
Subjective   Patient ID: Jailyn Dai is a 89 y.o. female who is long term resident being seen and evaluated for multiple medical problems.    Alert, pleasant, forgetful. Denies pain, no respiratory distress noted. Ambulates short distances with walker. Denies recent falls. Appetite fair           Review of Systems   Constitutional:  Negative for fever and unexpected weight change.   HENT:  Negative for sore throat.    Respiratory:  Negative for cough and shortness of breath.    Cardiovascular:  Negative for chest pain and palpitations.   Gastrointestinal:  Negative for abdominal pain, constipation, diarrhea, nausea and vomiting.   Genitourinary:  Negative for difficulty urinating and frequency.   Musculoskeletal:  Positive for arthralgias. Negative for back pain.   Skin:  Negative for rash.   Neurological:  Negative for dizziness, seizures and headaches.   Psychiatric/Behavioral:  Negative for agitation. The patient is not nervous/anxious.        Objective   There were no vitals taken for this visit.    Physical Exam  Cardiovascular:      Rate and Rhythm: Normal rate and regular rhythm.   Pulmonary:      Breath sounds: Normal breath sounds.   Abdominal:      General: Bowel sounds are normal.      Palpations: Abdomen is soft.   Musculoskeletal:         General: No swelling.   Neurological:      Mental Status: She is alert.         Assessment/Plan   Problem List Items Addressed This Visit       Atherosclerotic heart disease of native coronary artery without angina pectoris - Primary    Chronic heart failure with preserved ejection fraction (HFpEF) (CMS/[facility])    Essential hypertension     Will monitor BP         Gastroesophageal reflux disease without esophagitis    Osteoarthritis    Dizziness     Other Visit Diagnoses       Lumbar radiculopathy                 Goals    None     Continue supportive care, analgesics as needed

## 2024-02-27 ENCOUNTER — NURSING HOME VISIT (OUTPATIENT)
Dept: POST ACUTE CARE | Facility: EXTERNAL LOCATION | Age: 89
End: 2024-02-27
Payer: MEDICARE

## 2024-02-27 DIAGNOSIS — F03.90 DEMENTIA WITHOUT BEHAVIORAL DISTURBANCE (MULTI): ICD-10-CM

## 2024-02-27 DIAGNOSIS — K21.9 GASTROESOPHAGEAL REFLUX DISEASE WITHOUT ESOPHAGITIS: ICD-10-CM

## 2024-02-27 DIAGNOSIS — F41.9 ANXIETY: ICD-10-CM

## 2024-02-27 DIAGNOSIS — M15.9 PRIMARY OSTEOARTHRITIS INVOLVING MULTIPLE JOINTS: Primary | ICD-10-CM

## 2024-02-27 DIAGNOSIS — I10 ESSENTIAL HYPERTENSION: ICD-10-CM

## 2024-02-27 PROCEDURE — 99308 SBSQ NF CARE LOW MDM 20: CPT | Performed by: INTERNAL MEDICINE

## 2024-02-27 NOTE — LETTER
Patient: Jailyn Dai  : 1930    Encounter Date: 2024    Subjective  Patient ID: Jailyn Dai is a 89 y.o. female who is long term resident being seen and evaluated for multiple medical problems.    Alert, pleasant, forgetful. no respiratory distress noted.  She is complaining of increased hip pain, ambulates short distances with walker. Denies recent falls.           Review of Systems   Constitutional:  Negative for fever and unexpected weight change.   HENT:  Negative for sore throat.    Respiratory:  Negative for cough and shortness of breath.    Cardiovascular:  Negative for chest pain and palpitations.   Gastrointestinal:  Negative for abdominal pain, constipation, diarrhea, nausea and vomiting.   Genitourinary:  Negative for difficulty urinating and frequency.   Musculoskeletal:  Positive for arthralgias. Negative for back pain.   Skin:  Negative for rash.   Neurological:  Negative for dizziness, seizures and headaches.   Psychiatric/Behavioral:  Negative for agitation. The patient is not nervous/anxious.        Objective  There were no vitals taken for this visit.    Physical Exam  Vitals reviewed.   Constitutional:       General: She is not in acute distress.  HENT:      Mouth/Throat:      Mouth: Mucous membranes are moist.   Cardiovascular:      Rate and Rhythm: Normal rate and regular rhythm.      Heart sounds: Normal heart sounds.   Pulmonary:      Breath sounds: Normal breath sounds. No rales.   Abdominal:      General: Bowel sounds are normal.      Palpations: Abdomen is soft.      Tenderness: There is no abdominal tenderness.   Musculoskeletal:         General: No swelling or tenderness.      Cervical back: Neck supple. No tenderness.   Skin:     General: Skin is warm.   Neurological:      General: No focal deficit present.      Mental Status: She is alert.   Psychiatric:         Behavior: Behavior normal.         Cognition and Memory: Memory is impaired.         Assessment/Plan  Problem List  Items Addressed This Visit       Anxiety    Dementia without behavioral disturbance (CMS/[facility])    Essential hypertension     Will monitor BP         Gastroesophageal reflux disease without esophagitis    Osteoarthritis - Primary        Goals    None     Will increase tramadol dose, continue fall precautions      Electronically Signed By: Swetha Garcia MD   3/17/24 10:04 PM

## 2024-02-29 ASSESSMENT — ENCOUNTER SYMPTOMS
FEVER: 0
PALPITATIONS: 0
AGITATION: 0
SEIZURES: 0
CONSTIPATION: 0
VOMITING: 0
COUGH: 0
DIARRHEA: 0
FREQUENCY: 0
UNEXPECTED WEIGHT CHANGE: 0
DIFFICULTY URINATING: 0
DIZZINESS: 0
NAUSEA: 0
ARTHRALGIAS: 1
SHORTNESS OF BREATH: 0
NERVOUS/ANXIOUS: 0
BACK PAIN: 0
ABDOMINAL PAIN: 0
SORE THROAT: 0
HEADACHES: 0

## 2024-02-29 NOTE — PROGRESS NOTES
Subjective   Patient ID: Jailyn Dai is a 89 y.o. female who is long term resident being seen and evaluated for multiple medical problems.    Alert, pleasant, forgetful. no respiratory distress noted.  She is complaining of increased hip pain, ambulates short distances with walker. Denies recent falls.           Review of Systems   Constitutional:  Negative for fever and unexpected weight change.   HENT:  Negative for sore throat.    Respiratory:  Negative for cough and shortness of breath.    Cardiovascular:  Negative for chest pain and palpitations.   Gastrointestinal:  Negative for abdominal pain, constipation, diarrhea, nausea and vomiting.   Genitourinary:  Negative for difficulty urinating and frequency.   Musculoskeletal:  Positive for arthralgias. Negative for back pain.   Skin:  Negative for rash.   Neurological:  Negative for dizziness, seizures and headaches.   Psychiatric/Behavioral:  Negative for agitation. The patient is not nervous/anxious.        Objective   There were no vitals taken for this visit.    Physical Exam  Vitals reviewed.   Constitutional:       General: She is not in acute distress.  HENT:      Mouth/Throat:      Mouth: Mucous membranes are moist.   Cardiovascular:      Rate and Rhythm: Normal rate and regular rhythm.      Heart sounds: Normal heart sounds.   Pulmonary:      Breath sounds: Normal breath sounds. No rales.   Abdominal:      General: Bowel sounds are normal.      Palpations: Abdomen is soft.      Tenderness: There is no abdominal tenderness.   Musculoskeletal:         General: No swelling or tenderness.      Cervical back: Neck supple. No tenderness.   Skin:     General: Skin is warm.   Neurological:      General: No focal deficit present.      Mental Status: She is alert.   Psychiatric:         Behavior: Behavior normal.         Cognition and Memory: Memory is impaired.         Assessment/Plan   Problem List Items Addressed This Visit       Anxiety    Dementia without  behavioral disturbance (CMS/[facility])    Essential hypertension     Will monitor BP         Gastroesophageal reflux disease without esophagitis    Osteoarthritis - Primary        Goals    None     Will increase tramadol dose, continue fall precautions

## 2024-03-05 ENCOUNTER — NURSING HOME VISIT (OUTPATIENT)
Dept: POST ACUTE CARE | Facility: EXTERNAL LOCATION | Age: 89
End: 2024-03-05
Payer: MEDICARE

## 2024-03-05 DIAGNOSIS — M15.9 PRIMARY OSTEOARTHRITIS INVOLVING MULTIPLE JOINTS: ICD-10-CM

## 2024-03-05 DIAGNOSIS — I50.32 CHRONIC HEART FAILURE WITH PRESERVED EJECTION FRACTION (HFPEF) (MULTI): Primary | ICD-10-CM

## 2024-03-05 DIAGNOSIS — K21.9 GASTROESOPHAGEAL REFLUX DISEASE WITHOUT ESOPHAGITIS: ICD-10-CM

## 2024-03-05 DIAGNOSIS — F03.90 DEMENTIA WITHOUT BEHAVIORAL DISTURBANCE (MULTI): ICD-10-CM

## 2024-03-05 DIAGNOSIS — I10 ESSENTIAL HYPERTENSION: ICD-10-CM

## 2024-03-05 PROCEDURE — 99308 SBSQ NF CARE LOW MDM 20: CPT | Performed by: INTERNAL MEDICINE

## 2024-03-05 NOTE — LETTER
Patient: Jailyn Dai  : 1930    Encounter Date: 2024    Subjective  Patient ID: Jailyn Dai is a 89 y.o. female who is long term resident being seen and evaluated for multiple medical problems.    Alert, pleasant, forgetful. Denies pain, no respiratory distress noted.  Has occasional nonproductive cough.  Had leg swelling persists, but stable.  Ambulates short distances with walker. Denies recent falls.           Review of Systems   Constitutional:  Negative for fever and unexpected weight change.   HENT:  Negative for sore throat.    Respiratory:  Negative for cough and shortness of breath.    Cardiovascular:  Negative for chest pain and palpitations.   Gastrointestinal:  Negative for abdominal pain, constipation, diarrhea, nausea and vomiting.   Genitourinary:  Negative for difficulty urinating and frequency.   Musculoskeletal:  Positive for arthralgias. Negative for back pain.   Skin:  Negative for rash.   Neurological:  Negative for dizziness, seizures and headaches.   Psychiatric/Behavioral:  Negative for agitation. The patient is not nervous/anxious.        Objective  There were no vitals taken for this visit.    Physical Exam  Vitals reviewed.   Constitutional:       General: She is not in acute distress.  HENT:      Mouth/Throat:      Mouth: Mucous membranes are moist.   Cardiovascular:      Rate and Rhythm: Normal rate and regular rhythm.      Heart sounds: Normal heart sounds.   Pulmonary:      Breath sounds: Normal breath sounds. No rales.   Abdominal:      General: Bowel sounds are normal.      Palpations: Abdomen is soft.      Tenderness: There is no abdominal tenderness.   Musculoskeletal:         General: No swelling or tenderness.      Cervical back: Neck supple. No tenderness.      Right lower leg: Edema present.      Left lower leg: Edema present.   Skin:     General: Skin is warm.   Neurological:      General: No focal deficit present.      Mental Status: She is alert.   Psychiatric:          Behavior: Behavior normal.         Assessment/Plan  Problem List Items Addressed This Visit       Dementia without behavioral disturbance (CMS/HCC)    Essential hypertension - Primary     Will monitor BP         Gastroesophageal reflux disease without esophagitis    Osteoarthritis        Goals    None     Will continue tramadol,  continue fall precautions, diuretics, fluid restriction      Electronically Signed By: Swetha Garcia MD   3/29/24 10:24 PM

## 2024-03-22 ENCOUNTER — NURSING HOME VISIT (OUTPATIENT)
Dept: POST ACUTE CARE | Facility: EXTERNAL LOCATION | Age: 89
End: 2024-03-22
Payer: MEDICARE

## 2024-03-22 DIAGNOSIS — M15.9 PRIMARY OSTEOARTHRITIS INVOLVING MULTIPLE JOINTS: ICD-10-CM

## 2024-03-22 DIAGNOSIS — I50.32 CHRONIC HEART FAILURE WITH PRESERVED EJECTION FRACTION (HFPEF) (MULTI): Primary | ICD-10-CM

## 2024-03-22 DIAGNOSIS — K21.9 GASTROESOPHAGEAL REFLUX DISEASE WITHOUT ESOPHAGITIS: ICD-10-CM

## 2024-03-22 DIAGNOSIS — R42 DIZZINESS: ICD-10-CM

## 2024-03-22 DIAGNOSIS — I10 ESSENTIAL HYPERTENSION: ICD-10-CM

## 2024-03-22 DIAGNOSIS — F03.90 DEMENTIA WITHOUT BEHAVIORAL DISTURBANCE (MULTI): ICD-10-CM

## 2024-03-22 PROCEDURE — 99309 SBSQ NF CARE MODERATE MDM 30: CPT | Performed by: INTERNAL MEDICINE

## 2024-03-22 NOTE — LETTER
Patient: Jailyn Dai  : 1930    Encounter Date: 2024    Subjective  Patient ID: Jailyn Dai is a 89 y.o. female who is long term resident being seen and evaluated for multiple medical problems.    Alert, pleasant, forgetful. Denies pain, no respiratory distress noted.  She continues to have leg edema, states her legs feel heavy she spends a lot of time in wheelchair, ambulates short distances with walker. Denies recent falls.           Review of Systems   Constitutional:  Negative for fever and unexpected weight change.   HENT:  Negative for sore throat.    Respiratory:  Negative for cough and shortness of breath.    Cardiovascular:  Negative for chest pain and palpitations.   Gastrointestinal:  Negative for abdominal pain, constipation, diarrhea, nausea and vomiting.   Genitourinary:  Negative for difficulty urinating and frequency.   Musculoskeletal:  Positive for arthralgias. Negative for back pain.   Skin:  Negative for rash.   Neurological:  Negative for dizziness, seizures and headaches.   Psychiatric/Behavioral:  Negative for agitation. The patient is not nervous/anxious.        Objective  There were no vitals taken for this visit.    Physical Exam  Vitals reviewed.   Constitutional:       General: She is not in acute distress.  HENT:      Mouth/Throat:      Mouth: Mucous membranes are moist.   Cardiovascular:      Rate and Rhythm: Normal rate and regular rhythm.      Heart sounds: Normal heart sounds.   Pulmonary:      Breath sounds: Normal breath sounds. No rales.   Abdominal:      General: Bowel sounds are normal.      Palpations: Abdomen is soft.      Tenderness: There is no abdominal tenderness.   Musculoskeletal:         General: No swelling or tenderness.      Cervical back: Neck supple. No tenderness.      Right lower leg: Edema present.      Left lower leg: Edema present.   Skin:     General: Skin is warm.   Neurological:      General: No focal deficit present.      Mental Status: She is  alert.   Psychiatric:         Behavior: Behavior normal.         Assessment/Plan  Problem List Items Addressed This Visit       Chronic heart failure with preserved ejection fraction (HFpEF) (CMS/HCC) - Primary    Dementia without behavioral disturbance (CMS/HCC)    Essential hypertension     Will monitor BP         Gastroesophageal reflux disease without esophagitis    Osteoarthritis    Dizziness        Goals    None     Will obtain labs, chest x-ray, continue diuretics, ACE wraps      Electronically Signed By: Swetha Garcia MD   3/29/24 10:26 PM

## 2024-03-25 ASSESSMENT — ENCOUNTER SYMPTOMS
VOMITING: 0
BACK PAIN: 0
ABDOMINAL PAIN: 0
NAUSEA: 0
FEVER: 0
COUGH: 0
AGITATION: 0
HEADACHES: 0
DIFFICULTY URINATING: 0
CONSTIPATION: 0
FREQUENCY: 0
ARTHRALGIAS: 1
SORE THROAT: 0
UNEXPECTED WEIGHT CHANGE: 0
SEIZURES: 0
NERVOUS/ANXIOUS: 0
SHORTNESS OF BREATH: 0
DIZZINESS: 0
DIARRHEA: 0
PALPITATIONS: 0

## 2024-03-25 NOTE — PROGRESS NOTES
Subjective   Patient ID: Jailyn Dai is a 89 y.o. female who is long term resident being seen and evaluated for multiple medical problems.    Alert, pleasant, forgetful. Denies pain, no respiratory distress noted.  Has occasional nonproductive cough.  Had leg swelling persists, but stable.  Ambulates short distances with walker. Denies recent falls.           Review of Systems   Constitutional:  Negative for fever and unexpected weight change.   HENT:  Negative for sore throat.    Respiratory:  Negative for cough and shortness of breath.    Cardiovascular:  Negative for chest pain and palpitations.   Gastrointestinal:  Negative for abdominal pain, constipation, diarrhea, nausea and vomiting.   Genitourinary:  Negative for difficulty urinating and frequency.   Musculoskeletal:  Positive for arthralgias. Negative for back pain.   Skin:  Negative for rash.   Neurological:  Negative for dizziness, seizures and headaches.   Psychiatric/Behavioral:  Negative for agitation. The patient is not nervous/anxious.        Objective   There were no vitals taken for this visit.    Physical Exam  Vitals reviewed.   Constitutional:       General: She is not in acute distress.  HENT:      Mouth/Throat:      Mouth: Mucous membranes are moist.   Cardiovascular:      Rate and Rhythm: Normal rate and regular rhythm.      Heart sounds: Normal heart sounds.   Pulmonary:      Breath sounds: Normal breath sounds. No rales.   Abdominal:      General: Bowel sounds are normal.      Palpations: Abdomen is soft.      Tenderness: There is no abdominal tenderness.   Musculoskeletal:         General: No swelling or tenderness.      Cervical back: Neck supple. No tenderness.      Right lower leg: Edema present.      Left lower leg: Edema present.   Skin:     General: Skin is warm.   Neurological:      General: No focal deficit present.      Mental Status: She is alert.   Psychiatric:         Behavior: Behavior normal.         Assessment/Plan   Problem  List Items Addressed This Visit       Dementia without behavioral disturbance (CMS/[facility])    Essential hypertension - Primary     Will monitor BP         Gastroesophageal reflux disease without esophagitis    Osteoarthritis        Goals    None     Will continue tramadol,  continue fall precautions, diuretics, fluid restriction

## 2024-03-28 ASSESSMENT — ENCOUNTER SYMPTOMS
VOMITING: 0
AGITATION: 0
ARTHRALGIAS: 1
CONSTIPATION: 0
FEVER: 0
HEADACHES: 0
PALPITATIONS: 0
UNEXPECTED WEIGHT CHANGE: 0
FREQUENCY: 0
SEIZURES: 0
NERVOUS/ANXIOUS: 0
DIARRHEA: 0
ABDOMINAL PAIN: 0
BACK PAIN: 0
DIFFICULTY URINATING: 0
NAUSEA: 0
SORE THROAT: 0
DIZZINESS: 0
COUGH: 0
SHORTNESS OF BREATH: 0

## 2024-03-28 NOTE — PROGRESS NOTES
Subjective   Patient ID: Jailyn Dai is a 89 y.o. female who is long term resident being seen and evaluated for multiple medical problems.    Alert, pleasant, forgetful. Denies pain, no respiratory distress noted.  She continues to have leg edema, states her legs feel heavy she spends a lot of time in wheelchair, ambulates short distances with walker. Denies recent falls.           Review of Systems   Constitutional:  Negative for fever and unexpected weight change.   HENT:  Negative for sore throat.    Respiratory:  Negative for cough and shortness of breath.    Cardiovascular:  Negative for chest pain and palpitations.   Gastrointestinal:  Negative for abdominal pain, constipation, diarrhea, nausea and vomiting.   Genitourinary:  Negative for difficulty urinating and frequency.   Musculoskeletal:  Positive for arthralgias. Negative for back pain.   Skin:  Negative for rash.   Neurological:  Negative for dizziness, seizures and headaches.   Psychiatric/Behavioral:  Negative for agitation. The patient is not nervous/anxious.        Objective   There were no vitals taken for this visit.    Physical Exam  Vitals reviewed.   Constitutional:       General: She is not in acute distress.  HENT:      Mouth/Throat:      Mouth: Mucous membranes are moist.   Cardiovascular:      Rate and Rhythm: Normal rate and regular rhythm.      Heart sounds: Normal heart sounds.   Pulmonary:      Breath sounds: Normal breath sounds. No rales.   Abdominal:      General: Bowel sounds are normal.      Palpations: Abdomen is soft.      Tenderness: There is no abdominal tenderness.   Musculoskeletal:         General: No swelling or tenderness.      Cervical back: Neck supple. No tenderness.      Right lower leg: Edema present.      Left lower leg: Edema present.   Skin:     General: Skin is warm.   Neurological:      General: No focal deficit present.      Mental Status: She is alert.   Psychiatric:         Behavior: Behavior normal.          Assessment/Plan   Problem List Items Addressed This Visit       Chronic heart failure with preserved ejection fraction (HFpEF) (CMS/[facility]) - Primary    Dementia without behavioral disturbance (CMS/[facility])    Essential hypertension     Will monitor BP         Gastroesophageal reflux disease without esophagitis    Osteoarthritis    Dizziness        Goals    None     Will obtain labs, chest x-ray, continue diuretics, ACE wraps

## 2024-04-01 ENCOUNTER — APPOINTMENT (OUTPATIENT)
Dept: RADIOLOGY | Facility: HOSPITAL | Age: 89
End: 2024-04-01
Payer: MEDICARE

## 2024-04-01 ENCOUNTER — APPOINTMENT (OUTPATIENT)
Dept: CARDIOLOGY | Facility: HOSPITAL | Age: 89
End: 2024-04-01
Payer: MEDICARE

## 2024-04-01 ENCOUNTER — HOSPITAL ENCOUNTER (EMERGENCY)
Facility: HOSPITAL | Age: 89
Discharge: HOME | End: 2024-04-01
Attending: EMERGENCY MEDICINE
Payer: MEDICARE

## 2024-04-01 VITALS
BODY MASS INDEX: 38.58 KG/M2 | HEIGHT: 61 IN | HEART RATE: 56 BPM | WEIGHT: 204.37 LBS | RESPIRATION RATE: 16 BRPM | OXYGEN SATURATION: 93 % | TEMPERATURE: 97.7 F | SYSTOLIC BLOOD PRESSURE: 131 MMHG | DIASTOLIC BLOOD PRESSURE: 56 MMHG

## 2024-04-01 DIAGNOSIS — R60.0 BILATERAL LOWER EXTREMITY EDEMA: ICD-10-CM

## 2024-04-01 DIAGNOSIS — R06.00 DYSPNEA, UNSPECIFIED TYPE: Primary | ICD-10-CM

## 2024-04-01 LAB
ANION GAP SERPL CALC-SCNC: 12 MMOL/L
BASOPHILS # BLD AUTO: 0.05 X10*3/UL (ref 0–0.1)
BASOPHILS NFR BLD AUTO: 0.6 %
BUN SERPL-MCNC: 33 MG/DL (ref 8–25)
CALCIUM SERPL-MCNC: 9 MG/DL (ref 8.5–10.4)
CHLORIDE SERPL-SCNC: 96 MMOL/L (ref 97–107)
CO2 SERPL-SCNC: 26 MMOL/L (ref 24–31)
CREAT SERPL-MCNC: 1.5 MG/DL (ref 0.4–1.6)
EGFRCR SERPLBLD CKD-EPI 2021: 32 ML/MIN/1.73M*2
EOSINOPHIL # BLD AUTO: 0.25 X10*3/UL (ref 0–0.4)
EOSINOPHIL NFR BLD AUTO: 3.1 %
ERYTHROCYTE [DISTWIDTH] IN BLOOD BY AUTOMATED COUNT: 12.9 % (ref 11.5–14.5)
GLUCOSE SERPL-MCNC: 106 MG/DL (ref 65–99)
HCT VFR BLD AUTO: 36.1 % (ref 36–46)
HGB BLD-MCNC: 11.9 G/DL (ref 12–16)
IMM GRANULOCYTES # BLD AUTO: 0.03 X10*3/UL (ref 0–0.5)
IMM GRANULOCYTES NFR BLD AUTO: 0.4 % (ref 0–0.9)
LYMPHOCYTES # BLD AUTO: 1.1 X10*3/UL (ref 0.8–3)
LYMPHOCYTES NFR BLD AUTO: 13.8 %
MCH RBC QN AUTO: 32.7 PG (ref 26–34)
MCHC RBC AUTO-ENTMCNC: 33 G/DL (ref 32–36)
MCV RBC AUTO: 99 FL (ref 80–100)
MONOCYTES # BLD AUTO: 0.94 X10*3/UL (ref 0.05–0.8)
MONOCYTES NFR BLD AUTO: 11.8 %
NEUTROPHILS # BLD AUTO: 5.6 X10*3/UL (ref 1.6–5.5)
NEUTROPHILS NFR BLD AUTO: 70.3 %
NRBC BLD-RTO: 0 /100 WBCS (ref 0–0)
NT-PROBNP SERPL-MCNC: 459 PG/ML (ref 0–624)
PLATELET # BLD AUTO: 229 X10*3/UL (ref 150–450)
POTASSIUM SERPL-SCNC: 4.9 MMOL/L (ref 3.4–5.1)
RBC # BLD AUTO: 3.64 X10*6/UL (ref 4–5.2)
SODIUM SERPL-SCNC: 134 MMOL/L (ref 133–145)
TROPONIN T SERPL-MCNC: 38 NG/L
TROPONIN T SERPL-MCNC: 40 NG/L
WBC # BLD AUTO: 8 X10*3/UL (ref 4.4–11.3)

## 2024-04-01 PROCEDURE — 83880 ASSAY OF NATRIURETIC PEPTIDE: CPT | Performed by: EMERGENCY MEDICINE

## 2024-04-01 PROCEDURE — 99283 EMERGENCY DEPT VISIT LOW MDM: CPT | Mod: 25

## 2024-04-01 PROCEDURE — 84484 ASSAY OF TROPONIN QUANT: CPT | Performed by: EMERGENCY MEDICINE

## 2024-04-01 PROCEDURE — 93005 ELECTROCARDIOGRAM TRACING: CPT

## 2024-04-01 PROCEDURE — 85025 COMPLETE CBC W/AUTO DIFF WBC: CPT | Performed by: EMERGENCY MEDICINE

## 2024-04-01 PROCEDURE — 71045 X-RAY EXAM CHEST 1 VIEW: CPT | Performed by: RADIOLOGY

## 2024-04-01 PROCEDURE — 36415 COLL VENOUS BLD VENIPUNCTURE: CPT | Performed by: EMERGENCY MEDICINE

## 2024-04-01 PROCEDURE — 71045 X-RAY EXAM CHEST 1 VIEW: CPT

## 2024-04-01 PROCEDURE — 82374 ASSAY BLOOD CARBON DIOXIDE: CPT | Performed by: EMERGENCY MEDICINE

## 2024-04-01 RX ORDER — AZITHROMYCIN 250 MG/1
TABLET, FILM COATED ORAL
Qty: 6 TABLET | Refills: 0 | Status: SHIPPED | OUTPATIENT
Start: 2024-04-01 | End: 2024-04-06

## 2024-04-01 ASSESSMENT — PAIN SCALES - GENERAL: PAINLEVEL_OUTOF10: 0 - NO PAIN

## 2024-04-01 ASSESSMENT — COLUMBIA-SUICIDE SEVERITY RATING SCALE - C-SSRS
1. IN THE PAST MONTH, HAVE YOU WISHED YOU WERE DEAD OR WISHED YOU COULD GO TO SLEEP AND NOT WAKE UP?: NO
6. HAVE YOU EVER DONE ANYTHING, STARTED TO DO ANYTHING, OR PREPARED TO DO ANYTHING TO END YOUR LIFE?: NO
2. HAVE YOU ACTUALLY HAD ANY THOUGHTS OF KILLING YOURSELF?: NO

## 2024-04-01 ASSESSMENT — PAIN - FUNCTIONAL ASSESSMENT: PAIN_FUNCTIONAL_ASSESSMENT: 0-10

## 2024-04-01 NOTE — PROGRESS NOTES
Attestation/Supervisory note for CANDACE Yoo      The patient is a 89-year-old female presenting to the emergency department by private ambulance, reportedly at the direction of her primary care physician, Dr. Garcia, chronic leg swelling.  The patient does have dementia.  She states that she does have chronic swelling of her legs and chronic shortness of breath and dyspnea on exertion.  Has been going on for the past 3 to 4 years.  She states that she does not have any recent changes in her symptoms.  She states that she does not know why she was sent to the emergency room.  She states that no one told her that she was coming until the ambulance crew arrived to pick her up.  She denies any headache or visual changes.  No chest pain.  No abdominal pain.  No nausea or vomiting.  No diarrhea or constipation but no urinary complaints.  All pertinent positives and negatives are recorded above.  All other systems reviewed and otherwise negative.  Vital signs within normal limits.  Physical exam with a well-nourished well-developed female in no acute distress.  HEENT exam within normal limits.  She has no evidence of airway compromise or respiratory distress.  She does have bilateral lower extremity pitting edema.      I discussed the patient's case with her primary care physician, Dr. Garcia.  She states that she did not direct the staff at the long[facility] to send the patient to the emergency department.  She states that she believes that the patient's son was likely the person who asked them to send her.  She states that the patient does have dementia.  She is at her baseline mental status.  She reportedly does have chronic CHF and is on a diuretic.  She reportedly does have some mild renal insufficiency.  She has had a send echocardiogram and Dr. Garcia is arranging for an outpatient follow-up with cardiology.  She reportedly does not have any recent changes in her swelling or her shortness of breath according  to Dr. Garcia.      EKG with sinus rhythm at 77 bpm, occasional PACs, normal axis, normal voltage, normal ST segment, normal T waves      Diagnostic labs with a mild electrolyte imbalance, mild anemia and mildly elevated troponin T values but otherwise unremarkable.      Initial Troponin T 40. Repeat trop T 38      XR chest 1 view   Final Result   1. Patchy infiltrate demonstrated in the left mid lung laterally.   Follow-up to clearing.        Signed by: Florinda Yepez 4/1/2024 12:22 PM   Dictation workstation:   [id number]           The patient does not have any evidence of STEMI on EKG.  Cardiac enzymes are mildly elevated but are flat.  No events on telemetry.  The patient does not have any evidence of airway compromise or respiratory distress on exam.  The chest x-ray shows a patchy infiltrate in the left midlung but no other acute process.  No evidence of overt pneumonia, CHF and or pneumothorax.      The case was discussed with the patient's primary care physician, Dr. Garcia.  The patient was released in good condition.  She was returned to the long-term care facility where she resides.  She will follow-up with her primary care physician and cardiology as scheduled.  She will return to the emergency department sooner with worsening of symptoms or onset of new symptoms.  Given for azithromycin to treat for the infiltrate on chest x-ray as this may represent a developing pneumonia.        Impression/diagnosis  Dyspnea, dyspnea on exertion, chronic  Bilateral lower extremity swelling, chronic  Pulmonary infiltrate      I personally saw the patient and made/approve the management plan and take responsibility for the patient management.      I independently interpreted the following study (S): EKG and diagnostic labs      I personally discussed the patient's management with the patient      I reviewed the results of the diagnostic labs and diagnostic imaging.  Formal radiology read was completed by the  radiologist.      Dot Lopez MD

## 2024-04-01 NOTE — DISCHARGE INSTRUCTIONS
Thank you for choosing [facility] for your healthcare needs today!    You have been written a prescription for possible pneumonia.  Please take this prescription as directed on the package insert.    Return to the emergency department if symptoms worsen or new symptoms develop.    Follow-up with your primary care provider as recommended after today's encounter.  Please call and schedule an appointment with them as soon as you are able to.

## 2024-04-01 NOTE — ED NOTES
Placed call to [facility].   Spoke to [facility] to schedule transport.   ETA 1800 back to [facility]     Li Simon, TEN  04/01/24 4635

## 2024-04-01 NOTE — ED PROVIDER NOTES
HPI   No chief complaint on file.      HPI     Patient is a 89-year-old female coming to the emergency department via EMS from [facility] for evaluation of bilateral lower extremity edema at the recommendations of her primary care physician Dr. Garcia.  This bilateral lower extremity edema is chronic for the patient, has been present for approximately 3 years.  Patient states she does not know why she was sent to the emergency department.  She is not having any symptoms.  No chest pain or shortness of breath.  No abdominal pain, nausea, vomiting, diarrhea or constipation.  There have been no changes to her lower extremity swelling.               Inderjit Coma Scale Score: 15                     Patient History   Past Medical History:   Diagnosis Date    Angina pectoris (CMS/Formerly [facility]) 11/13/2023    Anxiety     Messina's esophagus     CAD (coronary artery disease)     CHF (congestive heart failure) (CMS/Formerly [facility])     Dementia (CMS/Formerly [facility])     Disability of walking 10/17/2023    Dizziness 11/13/2023    Essential hypertension     GERD (gastroesophageal reflux disease)     Lightheadedness 11/13/2023    Lumbar radiculopathy 10/17/2023    Mixed hyperlipidemia 10/17/2023    Shortness of breath 11/13/2023     Past Surgical History:   Procedure Laterality Date    TOTAL HIP ARTHROPLASTY Right      No family history on file.  Social History     Tobacco Use    Smoking status: Never     Passive exposure: Never    Smokeless tobacco: Never   Vaping Use    Vaping Use: Never used   Substance Use Topics    Alcohol use: Never    Drug use: Never       Physical Exam   ED Triage Vitals   Temp Pulse Resp BP   -- -- -- --      SpO2 Temp src Heart Rate Source Patient Position   -- -- -- --      BP Location FiO2 (%)     -- --       Physical Exam    GENERAL: Well nourished and well developed. Answers questions appropriately.    SKIN: Clean and dry without evidence of rashes.    HEENT: Skull normocephalic, atraumatic. No scleral icterus. PERRLA, with  EOMI.  Mucous membranes moist and pink in color.     RESPIRATORY: Clear to ausculation with normal effort. No adventitious sounds, intercostal retractions or shallow breathing.    CARDIOVASCULAR: Regular rate and rhythm with normal S1, S2. No S3, S4, murmurs or gallops. Capillary refill brisk, less than 3 seconds bilaterally.  Bilateral lower extremity edema.    ABD/: Soft, nontender abdomen. Bowel sounds equal in all four quadrants. No tenderness, rebound, guarding or rigidity.    MSK: Spontaneously moves all 4 extremities without difficulty.  No injury or obvious deformity.      NEURO/PSYCH: A&Ox3. Cranial nerves II-XII grossly intact. No focal motor or sensory deficits. Appropriate mood and behavior.    ED Course & MDM   Diagnoses as of 04/01/24 1626   Dyspnea, unspecified type   Bilateral lower extremity edema       Medical Decision Making  Parts of this chart have been completed using voice recognition software. Please excuse any errors of transcription. Despite the medical decision making time stamp above-my medical decision making has taken place during the patient's entire visit. My thought process and reason for plan has been formulated from the time that I saw the patient until the time of disposition and is not specific to one specific moment during their visit and furthermore my MDM encompasses this entire chart and not only this text box.      HPI: Detailed above.    Exam: A medically appropriate exam performed, outlined above, given the known history and presentation.    History obtained from: Patient    Social Determinants of Health considered during this visit: Age, resident of rehab facility    EKG interpreted by my attending physician, reviewed by myself.    Labs Reviewed   CBC WITH AUTO DIFFERENTIAL - Abnormal       Result Value    WBC 8.0      nRBC 0.0      RBC 3.64 (*)     Hemoglobin 11.9 (*)     Hematocrit 36.1      MCV 99      MCH 32.7      MCHC 33.0      RDW 12.9      Platelets 229       Neutrophils % 70.3      Immature Granulocytes %, Automated 0.4      Lymphocytes % 13.8      Monocytes % 11.8      Eosinophils % 3.1      Basophils % 0.6      Neutrophils Absolute 5.60 (*)     Immature Granulocytes Absolute, Automated 0.03      Lymphocytes Absolute 1.10      Monocytes Absolute 0.94 (*)     Eosinophils Absolute 0.25      Basophils Absolute 0.05     BASIC METABOLIC PANEL - Abnormal    Glucose 106 (*)     Sodium 134      Potassium 4.9      Chloride 96 (*)     Bicarbonate 26      Urea Nitrogen 33 (*)     Creatinine 1.50      eGFR 32 (*)     Calcium 9.0      Anion Gap 12     SERIAL TROPONIN, INITIAL ([facility]) - Abnormal    Troponin T, High Sensitivity 40 (*)    SERIAL TROPONIN,  2 HOUR ([facility]) - Abnormal    Troponin T, High Sensitivity 38 (*)    N-TERMINAL PROBNP - Normal    PROBNP 459      Narrative:     Reference ranges are based on clinical submission data. These ranges represent the 95th percentile of normal cut-off points. As NT Pro- BNP values approach 1000 pg/ml, clinical symptoms are more likely associated with CHF.   TROPONIN T SERIES, HIGH SENSITIVITY (0, 2 HR, 6 HR)    Narrative:     The following orders were created for panel order Troponin T Series, High Sensitivity (0, 2HR, 6HR).  Procedure                               Abnormality         Status                     ---------                               -----------         ------                     Serial Troponin, Initial...[[id number]]  Abnormal            Final result               Serial Troponin, 2 Hour ...[[id number]]  Abnormal            Final result               Serial Troponin, 6 Hour ...[[id number]]                                                   Please view results for these tests on the individual orders.   SERIAL TROPONIN,  2 HOUR ([facility])   SERIAL TROPONIN, 6 HOUR ([facility])      XR chest 1 view   Final Result   1. Patchy infiltrate demonstrated in the left mid lung laterally.   Follow-up to clearing.        Signed by: Florinda Yepez  4/1/2024 12:22 PM   Dictation workstation:   [id number]          Medications - No data to display     Differential diagnoses considered for this visit include: Congestive heart failure versus pneumonia versus chronic edema    Considerations/further MDM:    My attending physician discussed the patient case with her primary care physician, Dr. Garcia.  She states that she did not direct the staff to send the patient to the emergency department.  She believes the patient's son is likely the person that sent her.  CBC was unremarkable.  proBNP at 459.Chest x-ray demonstrates a patchy infiltrate demonstrated in the left midlung laterally.  Initial troponin of 40, repeat of 38.  Patient's blood counts today were stable.  She had no evidence of ischemia on EKG.  Patient will be discharged back to her facility with a prescription for azithromycin to treat for possible infiltrate.  Return precautions discussed with the patient and brother at the bedside.  Patient was released back to her facility in good condition.    Patient was seen in conjunction with attending physician Dr. Dot Lopez   Patient's history, physical exam, diagnostic studies, and treatment plan were discussed thoroughly.    Procedure  Procedures     Nayla Yoo PA-C  04/01/24 0305

## 2024-04-02 ENCOUNTER — NURSING HOME VISIT (OUTPATIENT)
Dept: POST ACUTE CARE | Facility: EXTERNAL LOCATION | Age: 89
End: 2024-04-02
Payer: MEDICARE

## 2024-04-02 DIAGNOSIS — K21.9 GASTROESOPHAGEAL REFLUX DISEASE WITHOUT ESOPHAGITIS: ICD-10-CM

## 2024-04-02 DIAGNOSIS — I50.32 CHRONIC HEART FAILURE WITH PRESERVED EJECTION FRACTION (HFPEF) (MULTI): ICD-10-CM

## 2024-04-02 DIAGNOSIS — J18.9 COMMUNITY ACQUIRED PNEUMONIA OF LEFT LOWER LOBE OF LUNG: Primary | ICD-10-CM

## 2024-04-02 DIAGNOSIS — M15.9 PRIMARY OSTEOARTHRITIS INVOLVING MULTIPLE JOINTS: ICD-10-CM

## 2024-04-02 DIAGNOSIS — F03.90 DEMENTIA WITHOUT BEHAVIORAL DISTURBANCE (MULTI): ICD-10-CM

## 2024-04-02 LAB
ATRIAL RATE: 77 BPM
P AXIS: 55 DEGREES
P OFFSET: 208 MS
P ONSET: 149 MS
PR INTERVAL: 162 MS
Q ONSET: 230 MS
QRS COUNT: 12 BEATS
QRS DURATION: 80 MS
QT INTERVAL: 398 MS
QTC CALCULATION(BAZETT): 450 MS
QTC FREDERICIA: 432 MS
R AXIS: 1 DEGREES
T AXIS: 51 DEGREES
T OFFSET: 429 MS
VENTRICULAR RATE: 77 BPM

## 2024-04-02 PROCEDURE — 99309 SBSQ NF CARE MODERATE MDM 30: CPT | Performed by: INTERNAL MEDICINE

## 2024-04-02 NOTE — LETTER
Patient: Jailyn Dai  : 1930    Encounter Date: 2024    Subjective  Patient ID: Jailyn Dai is a 89 y.o. female who is long term resident being seen and evaluated for multiple medical problems.    Alert, pleasant, forgetful.  She has a productive cough, denies shortness of breath.  She was evaluated in ER yesterday for sinus request for cough and leg edema, chest x-ray showed a patchy infiltrate, started on [place]. Ambulates short distances with walker. Denies recent falls. Compliant with medications.           Review of Systems   Constitutional:  Negative for fever and unexpected weight change.   HENT:  Negative for sore throat.    Respiratory:  Negative for cough and shortness of breath.    Cardiovascular:  Negative for chest pain and palpitations.   Gastrointestinal:  Negative for abdominal pain, constipation, diarrhea, nausea and vomiting.   Genitourinary:  Negative for difficulty urinating and frequency.   Musculoskeletal:  Positive for arthralgias. Negative for back pain.   Skin:  Negative for rash.   Neurological:  Negative for dizziness, seizures and headaches.   Psychiatric/Behavioral:  Negative for agitation. The patient is not nervous/anxious.        Objective  There were no vitals taken for this visit.    Physical Exam  Vitals reviewed.   Constitutional:       General: She is not in acute distress.  HENT:      Mouth/Throat:      Mouth: Mucous membranes are moist.   Cardiovascular:      Rate and Rhythm: Normal rate and regular rhythm.      Heart sounds: Normal heart sounds.   Pulmonary:      Breath sounds: Normal breath sounds. No rales.   Abdominal:      General: Bowel sounds are normal.      Palpations: Abdomen is soft.      Tenderness: There is no abdominal tenderness.   Musculoskeletal:         General: No swelling or tenderness.      Cervical back: Neck supple. No tenderness.      Right lower leg: Edema present.      Left lower leg: Edema present.   Skin:     General: Skin is warm.    Neurological:      General: No focal deficit present.      Mental Status: She is alert.   Psychiatric:         Behavior: Behavior normal.         Assessment/Plan  Problem List Items Addressed This Visit       Chronic heart failure with preserved ejection fraction (HFpEF) (Multi)    Dementia without behavioral disturbance (Multi)    Gastroesophageal reflux disease without esophagitis    Osteoarthritis     Other Visit Diagnoses       Community acquired pneumonia of left lower lobe of lung    -  Primary             Goals    None     Will continue antibiotics,, continue diuretics, ACE wraps.  I discussed patient's condition with son      Electronically Signed By: Swetha Garcia MD   5/18/24  8:08 PM

## 2024-04-16 ASSESSMENT — ENCOUNTER SYMPTOMS
ABDOMINAL PAIN: 0
DIZZINESS: 0
VOMITING: 0
BACK PAIN: 0
PALPITATIONS: 0
AGITATION: 0
NAUSEA: 0
DIARRHEA: 0
DIFFICULTY URINATING: 0
SORE THROAT: 0
ARTHRALGIAS: 1
CONSTIPATION: 0
FREQUENCY: 0
NERVOUS/ANXIOUS: 0
UNEXPECTED WEIGHT CHANGE: 0
SEIZURES: 0
SHORTNESS OF BREATH: 0
HEADACHES: 0
COUGH: 0
FEVER: 0

## 2024-04-16 NOTE — PROGRESS NOTES
Subjective   Patient ID: Jailyn Dai is a 89 y.o. female who is long term resident being seen and evaluated for multiple medical problems.    Alert, pleasant, forgetful.  She has a productive cough, denies shortness of breath.  She was evaluated in ER yesterday for sinus request for cough and leg edema, chest x-ray showed a patchy infiltrate, started on Z-[place]. Ambulates short distances with walker. Denies recent falls. Compliant with medications.           Review of Systems   Constitutional:  Negative for fever and unexpected weight change.   HENT:  Negative for sore throat.    Respiratory:  Negative for cough and shortness of breath.    Cardiovascular:  Negative for chest pain and palpitations.   Gastrointestinal:  Negative for abdominal pain, constipation, diarrhea, nausea and vomiting.   Genitourinary:  Negative for difficulty urinating and frequency.   Musculoskeletal:  Positive for arthralgias. Negative for back pain.   Skin:  Negative for rash.   Neurological:  Negative for dizziness, seizures and headaches.   Psychiatric/Behavioral:  Negative for agitation. The patient is not nervous/anxious.        Objective   There were no vitals taken for this visit.    Physical Exam  Vitals reviewed.   Constitutional:       General: She is not in acute distress.  HENT:      Mouth/Throat:      Mouth: Mucous membranes are moist.   Cardiovascular:      Rate and Rhythm: Normal rate and regular rhythm.      Heart sounds: Normal heart sounds.   Pulmonary:      Breath sounds: Normal breath sounds. No rales.   Abdominal:      General: Bowel sounds are normal.      Palpations: Abdomen is soft.      Tenderness: There is no abdominal tenderness.   Musculoskeletal:         General: No swelling or tenderness.      Cervical back: Neck supple. No tenderness.      Right lower leg: Edema present.      Left lower leg: Edema present.   Skin:     General: Skin is warm.   Neurological:      General: No focal deficit present.      Mental  Status: She is alert.   Psychiatric:         Behavior: Behavior normal.         Assessment/Plan   Problem List Items Addressed This Visit       Chronic heart failure with preserved ejection fraction (HFpEF) (Multi)    Dementia without behavioral disturbance (Multi)    Gastroesophageal reflux disease without esophagitis    Osteoarthritis     Other Visit Diagnoses       Community acquired pneumonia of left lower lobe of lung    -  Primary             Goals    None     Will continue antibiotics,, continue diuretics, ACE wraps.  I discussed patient's condition with son

## 2024-04-29 ENCOUNTER — OFFICE VISIT (OUTPATIENT)
Dept: CARDIOLOGY | Facility: CLINIC | Age: 89
End: 2024-04-29
Payer: MEDICARE

## 2024-04-29 ENCOUNTER — ALLIED HEALTH (OUTPATIENT)
Dept: CARDIOLOGY | Facility: CLINIC | Age: 89
End: 2024-04-29

## 2024-04-29 VITALS
OXYGEN SATURATION: 97 % | SYSTOLIC BLOOD PRESSURE: 138 MMHG | HEART RATE: 82 BPM | HEIGHT: 61 IN | RESPIRATION RATE: 18 BRPM | BODY MASS INDEX: 37.38 KG/M2 | TEMPERATURE: 98.6 F | WEIGHT: 198 LBS | DIASTOLIC BLOOD PRESSURE: 68 MMHG

## 2024-04-29 DIAGNOSIS — M15.9 PRIMARY OSTEOARTHRITIS INVOLVING MULTIPLE JOINTS: Primary | ICD-10-CM

## 2024-04-29 DIAGNOSIS — Z86.79 HISTORY OF ATRIAL FIBRILLATION: ICD-10-CM

## 2024-04-29 DIAGNOSIS — I50.32 CHRONIC HEART FAILURE WITH PRESERVED EJECTION FRACTION (HFPEF) (MULTI): ICD-10-CM

## 2024-04-29 DIAGNOSIS — I10 ESSENTIAL HYPERTENSION: ICD-10-CM

## 2024-04-29 DIAGNOSIS — E78.2 MIXED HYPERLIPIDEMIA: Primary | ICD-10-CM

## 2024-04-29 DIAGNOSIS — I50.30 DIASTOLIC HEART FAILURE, UNSPECIFIED HF CHRONICITY (MULTI): ICD-10-CM

## 2024-04-29 DIAGNOSIS — R07.89 CHEST DISCOMFORT: ICD-10-CM

## 2024-04-29 PROBLEM — R60.0 EDEMA OF BOTH LOWER EXTREMITIES: Status: ACTIVE | Noted: 2024-04-29

## 2024-04-29 PROBLEM — I49.9 CARDIAC ARRHYTHMIA: Status: ACTIVE | Noted: 2024-04-29

## 2024-04-29 PROBLEM — Z86.010 HISTORY OF COLONIC POLYPS: Status: ACTIVE | Noted: 2024-04-29

## 2024-04-29 PROBLEM — M25.559 HIP PAIN: Status: ACTIVE | Noted: 2024-04-29

## 2024-04-29 PROBLEM — Z86.0100 HISTORY OF COLONIC POLYPS: Status: ACTIVE | Noted: 2024-04-29

## 2024-04-29 PROCEDURE — 99214 OFFICE O/P EST MOD 30 MIN: CPT | Performed by: INTERNAL MEDICINE

## 2024-04-29 PROCEDURE — 1126F AMNT PAIN NOTED NONE PRSNT: CPT | Performed by: INTERNAL MEDICINE

## 2024-04-29 PROCEDURE — 1036F TOBACCO NON-USER: CPT | Performed by: INTERNAL MEDICINE

## 2024-04-29 PROCEDURE — 3078F DIAST BP <80 MM HG: CPT | Performed by: INTERNAL MEDICINE

## 2024-04-29 PROCEDURE — 3075F SYST BP GE 130 - 139MM HG: CPT | Performed by: INTERNAL MEDICINE

## 2024-04-29 PROCEDURE — 1160F RVW MEDS BY RX/DR IN RCRD: CPT | Performed by: INTERNAL MEDICINE

## 2024-04-29 PROCEDURE — 1159F MED LIST DOCD IN RCRD: CPT | Performed by: INTERNAL MEDICINE

## 2024-04-29 RX ORDER — LORAZEPAM 0.5 MG/1
TABLET ORAL
COMMUNITY

## 2024-04-29 RX ORDER — FLUTICASONE PROPIONATE 50 MCG
SPRAY, SUSPENSION (ML) NASAL
COMMUNITY
Start: 2024-03-11

## 2024-04-29 RX ORDER — DAPAGLIFLOZIN 10 MG/1
10 TABLET, FILM COATED ORAL DAILY
Qty: 30 TABLET | Refills: 11 | Status: SHIPPED | OUTPATIENT
Start: 2024-04-29 | End: 2025-04-29

## 2024-04-29 RX ORDER — TRAMADOL HYDROCHLORIDE 50 MG/1
50 TABLET ORAL 2 TIMES DAILY
Qty: 60 TABLET | Refills: 5 | Status: SHIPPED | OUTPATIENT
Start: 2024-04-29 | End: 2024-10-26

## 2024-04-29 RX ORDER — ALBUTEROL SULFATE 90 UG/1
AEROSOL, METERED RESPIRATORY (INHALATION)
COMMUNITY
Start: 2024-01-27

## 2024-04-29 RX ORDER — DICLOFENAC SODIUM 10 MG/G
GEL TOPICAL
COMMUNITY
Start: 2023-10-16

## 2024-04-29 RX ORDER — PERMETHRIN 50 MG/G
CREAM TOPICAL
COMMUNITY
Start: 2024-02-06

## 2024-04-29 RX ORDER — LOSARTAN POTASSIUM AND HYDROCHLOROTHIAZIDE 12.5; 5 MG/1; MG/1
TABLET ORAL
COMMUNITY

## 2024-04-29 RX ORDER — ACETAMINOPHEN 500 MG
TABLET ORAL
COMMUNITY

## 2024-04-29 RX ORDER — PANTOPRAZOLE SODIUM 40 MG/1
TABLET, DELAYED RELEASE ORAL
COMMUNITY

## 2024-04-29 RX ORDER — IBUPROFEN 400 MG/1
TABLET ORAL
COMMUNITY
Start: 2024-03-23 | End: 2024-04-29 | Stop reason: ALTCHOICE

## 2024-04-29 RX ORDER — HYDROCODONE BITARTRATE AND ACETAMINOPHEN 5; 325 MG/1; MG/1
TABLET ORAL
COMMUNITY

## 2024-04-29 RX ORDER — BUPROPION HYDROCHLORIDE 75 MG/1
TABLET ORAL
COMMUNITY
Start: 2023-11-20

## 2024-04-29 RX ORDER — SERTRALINE HYDROCHLORIDE 25 MG/1
TABLET, FILM COATED ORAL
COMMUNITY

## 2024-04-29 ASSESSMENT — ENCOUNTER SYMPTOMS
DEPRESSION: 0
LOSS OF SENSATION IN FEET: 0
OCCASIONAL FEELINGS OF UNSTEADINESS: 1

## 2024-04-29 ASSESSMENT — LIFESTYLE VARIABLES
SKIP TO QUESTIONS 9-10: 1
HOW OFTEN DO YOU HAVE SIX OR MORE DRINKS ON ONE OCCASION: NEVER
HOW OFTEN DO YOU HAVE A DRINK CONTAINING ALCOHOL: NEVER
AUDIT-C TOTAL SCORE: 0
HOW MANY STANDARD DRINKS CONTAINING ALCOHOL DO YOU HAVE ON A TYPICAL DAY: PATIENT DOES NOT DRINK

## 2024-04-29 ASSESSMENT — PATIENT HEALTH QUESTIONNAIRE - PHQ9
2. FEELING DOWN, DEPRESSED OR HOPELESS: NOT AT ALL
SUM OF ALL RESPONSES TO PHQ9 QUESTIONS 1 AND 2: 0
1. LITTLE INTEREST OR PLEASURE IN DOING THINGS: NOT AT ALL

## 2024-04-29 ASSESSMENT — PAIN SCALES - GENERAL: PAINLEVEL: 0-NO PAIN

## 2024-04-29 NOTE — PROGRESS NOTES
History of present illness:  This is a very pleasant 89-year-old functional female patient he follow-up in my office on history of heart failure diastolic dysfunction. Patient is reasonably functional had a stress test last year showed no ischemia ejection fraction preserved on the 2 D echo mild valvular aortic calcification..Patient currently at the nursing home.  Still complaining of severe lower extremity edema.  Has Ace wraps on her legs.  Still on furosemide 40 mg oral daily.  Last creatinine 1.4-1.5.  Denies having chest pain palpitations dizziness or lightheadedness.     Past Medical History:   Diagnosis Date    Angina pectoris (CMS-HCC) 11/13/2023    Anxiety     Messina's esophagus     CAD (coronary artery disease)     CHF (congestive heart failure) (Multi)     Dementia (Multi)     Disability of walking 10/17/2023    Dizziness 11/13/2023    Essential hypertension     GERD (gastroesophageal reflux disease)     Lightheadedness 11/13/2023    Lumbar radiculopathy 10/17/2023    Mixed hyperlipidemia 10/17/2023    Shortness of breath 11/13/2023       Past Surgical History:   Procedure Laterality Date    TOTAL HIP ARTHROPLASTY Right        Allergies   Allergen Reactions    Meclizine Hcl Dizziness        reports that she has never smoked. She has never been exposed to tobacco smoke. She has never used smokeless tobacco. She reports that she does not drink alcohol and does not use drugs.    No family history on file.    Patient's Medications   New Prescriptions    No medications on file   Previous Medications    ACETAMINOPHEN (TYLENOL) 500 MG TABLET    Take by mouth.    ALBUTEROL 90 MCG/ACTUATION INHALER        ASPIRIN (CALLY LOW DOSE ASPIRIN) 81 MG EC TABLET    Take 1 tablet (81 mg) by mouth once daily.    BUPROPION (WELLBUTRIN) 75 MG TABLET        CALCIUM CARBONATE-VITAMIN D3 ORAL    Take by mouth.    CHOLECALCIFEROL (VITAMIN D-3) 25 MCG (1000 UT) CAPSULE    Take 2 capsules (50 mcg) by mouth once daily.     CYANOCOBALAMIN (VITAMIN B-12) 1,000 MCG TABLET    Take 1 tablet (1,000 mcg) by mouth once daily.    DICLOFENAC SODIUM (VOLTAREN) 1 % GEL        DOCUSATE SODIUM (COLACE) 100 MG CAPSULE    Take 1 capsule (100 mg) by mouth once daily.    DULOXETINE (CYMBALTA) 20 MG DR CAPSULE    Take 1 capsule (20 mg) by mouth once daily.    FLUTICASONE (FLONASE) 50 MCG/ACTUATION NASAL SPRAY        FLUTICASONE-UMECLIDIN-VILANTER (TRELEGY-ELLIPTA) 100-62.5-25 MCG BLISTER WITH DEVICE    Inhale 1 puff once daily.    FUROSEMIDE (LASIX) 40 MG TABLET    Take 2 tablets (80 mg) by mouth once every 24 hours.    HYDROCODONE-ACETAMINOPHEN (NORCO) 5-325 MG TABLET    Take by mouth.    IBUPROFEN 400 MG TABLET        ISOSORBIDE MONONITRATE ER (IMDUR) 30 MG 24 HR TABLET    Take 1 tablet (30 mg) by mouth once every 24 hours.    LEVOTHYROXINE (EUTHYROX) 50 MCG TABLET    Take 1 tablet (50 mcg) by mouth once every 24 hours.    LORAZEPAM (ATIVAN) 0.5 MG TABLET    Take by mouth.    LOSARTAN (COZAAR) 50 MG TABLET    Take 1 tablet (50 mg) by mouth once every 24 hours.    LOSARTAN-HYDROCHLOROTHIAZIDE (HYZAAR) 50-12.5 MG TABLET    Take by mouth.    OMEPRAZOLE (PRILOSEC) 40 MG DR CAPSULE    Take 1 capsule (40 mg) by mouth once daily in the morning. Take before meals.    PANTOPRAZOLE (PROTONIX) 40 MG EC TABLET    Take by mouth.    PERMETHRIN (ELIMITE) 5 % CREAM        PRAVASTATIN (PRAVACHOL) 40 MG TABLET    Take 1 tablet (40 mg) by mouth once every 24 hours.    SERTRALINE (ZOLOFT) 25 MG TABLET    Take by mouth.    SPIRONOLACTONE (ALDACTONE) 25 MG TABLET    Take 1 tablet (25 mg) by mouth once every 24 hours.    TRAMADOL (ULTRAM) 50 MG TABLET    Take 1 tablet (50 mg) by mouth 2 times a day.    VIT A/VIT C/VIT E/ZINC/COPPER (ICAPS AREDS ORAL)    Take 1 capsule by mouth once daily.    VITAMIN E MIXED 400 UNIT TABLET    Take 1 tablet by mouth once daily.   Modified Medications    No medications on file   Discontinued Medications    No medications on file        Objective   Physical Exam  General: Patient in no acute distress   HEENT: Atraumatic normocephalic.  Neck: Supple, jugular venous pressure within normal limit.  No bruits  Lungs: Clear to auscultation bilaterally  Cardiovascular: Regular rate and rhythm, normal heart sounds, no murmurs rubs or gallops  Abdomen: Soft nontender nondistended.  Normal bowel sounds.  Extremities: Warm to touch, no edema.    Lab Review   Admission on 04/01/2024, Discharged on 04/01/2024   Component Date Value    WBC 04/01/2024 8.0     nRBC 04/01/2024 0.0     RBC 04/01/2024 3.64 (L)     Hemoglobin 04/01/2024 11.9 (L)     Hematocrit 04/01/2024 36.1     MCV 04/01/2024 99     MCH 04/01/2024 32.7     MCHC 04/01/2024 33.0     RDW 04/01/2024 12.9     Platelets 04/01/2024 229     Neutrophils % 04/01/2024 70.3     Immature Granulocytes %,* 04/01/2024 0.4     Lymphocytes % 04/01/2024 13.8     Monocytes % 04/01/2024 11.8     Eosinophils % 04/01/2024 3.1     Basophils % 04/01/2024 0.6     Neutrophils Absolute 04/01/2024 5.60 (H)     Immature Granulocytes Ab* 04/01/2024 0.03     Lymphocytes Absolute 04/01/2024 1.10     Monocytes Absolute 04/01/2024 0.94 (H)     Eosinophils Absolute 04/01/2024 0.25     Basophils Absolute 04/01/2024 0.05     Glucose 04/01/2024 106 (H)     Sodium 04/01/2024 134     Potassium 04/01/2024 4.9     Chloride 04/01/2024 96 (L)     Bicarbonate 04/01/2024 26     Urea Nitrogen 04/01/2024 33 (H)     Creatinine 04/01/2024 1.50     eGFR 04/01/2024 32 (L)     Calcium 04/01/2024 9.0     Anion Gap 04/01/2024 12     PROBNP 04/01/2024 459     Ventricular Rate 04/01/2024 77     Atrial Rate 04/01/2024 77     AZ Interval 04/01/2024 162     QRS Duration 04/01/2024 80     QT Interval 04/01/2024 398     QTC Calculation(Bazett) 04/01/2024 450     P Axis 04/01/2024 55     R Axis 04/01/2024 1     T Axis 04/01/2024 51     QRS Count 04/01/2024 12     Q Onset 04/01/2024 230     P Onset 04/01/2024 149     P Offset 04/01/2024 208     T Offset  04/01/2024 429     QTC Fredericia 04/01/2024 432     Troponin T, High Sensiti* 04/01/2024 40 (HH)     Troponin T, High Sensiti* 04/01/2024 38 (HH)         Assessment/Plan   Patient Active Problem List   Diagnosis    Anxiety    Atherosclerotic heart disease of native coronary artery without angina pectoris    Avascular necrosis of bone of left hip (Multi)    Messina's esophagus    Chronic heart failure with preserved ejection fraction (HFpEF) (Multi)    Dementia without behavioral disturbance (Multi)    Essential hypertension    Gastroesophageal reflux disease without esophagitis    History of atrial fibrillation    Hypothyroidism (acquired)    Osteoarthritis    Benign neoplasm of large bowel    Dizziness    Contusion of hand    Dislocation of shoulder joint    Fall    Frequent falls    Hyponatremia    Internal hemorrhoids    Lower leg edema    Nausea & vomiting    Obesity    Primary osteoarthritis of both hips    Physical debility    Vitamin B12 deficiency    History of colonic polyps    Hip pain    Edema of both lower extremities    Chest discomfort    Cardiac arrhythmia      This is a very pleasant 89-year-old functional female patient he follow-up in my office on history of heart failure diastolic dysfunction. Patient is reasonably functional had a stress test last year showed no ischemia ejection fraction preserved on the 2 D echo mild valvular aortic calcification..Patient currently at the nursing home.  Still complaining of severe lower extremity edema.  Has Ace wraps on her legs.  Still on furosemide 40 mg oral daily.  Last creatinine 1.4-1.5.  Denies having chest pain palpitations dizziness or lightheadedness.  Her lower extremity edema is the same in the morning and at night.  She still sleeps in bed.  At this point my recommendation is to start Farxiga 10 mg oral daily to help with the heart failure diastolic dysfunction.  She is following up with Dr. Patel also this week.  She may need repeated labs in a  month or so.  Discussed with her ischemic workup I really do not feel that she needs a cardiac catheterization and with her advanced age and other comorbidities the risk overweighs the benefit.  Discussed with her son also.  Will follow-up in 3 months.      Cassandra Camarillo MD

## 2024-05-07 ENCOUNTER — NURSING HOME VISIT (OUTPATIENT)
Dept: POST ACUTE CARE | Facility: EXTERNAL LOCATION | Age: 89
End: 2024-05-07
Payer: MEDICARE

## 2024-05-07 DIAGNOSIS — M15.9 PRIMARY OSTEOARTHRITIS INVOLVING MULTIPLE JOINTS: ICD-10-CM

## 2024-05-07 DIAGNOSIS — F03.90 DEMENTIA WITHOUT BEHAVIORAL DISTURBANCE (MULTI): ICD-10-CM

## 2024-05-07 DIAGNOSIS — F41.9 ANXIETY: ICD-10-CM

## 2024-05-07 DIAGNOSIS — I10 ESSENTIAL HYPERTENSION: Primary | ICD-10-CM

## 2024-05-07 DIAGNOSIS — R42 DIZZINESS: ICD-10-CM

## 2024-05-07 PROCEDURE — 99308 SBSQ NF CARE LOW MDM 20: CPT | Performed by: INTERNAL MEDICINE

## 2024-05-07 NOTE — LETTER
Patient: Jailyn Dai  : 1930    Encounter Date: 2024    Subjective  Patient ID: Jailyn Dai is a 89 y.o. female who is long term resident being seen and evaluated for multiple medical problems.    Alert, pleasant, forgetful. Denies pain. Ambulates short distances with walker. Denies recent falls. Compliant with medications. Appetite is good           Review of Systems   Constitutional:  Negative for fever and unexpected weight change.   HENT:  Negative for sore throat.    Respiratory:  Negative for cough and shortness of breath.    Cardiovascular:  Negative for chest pain and palpitations.   Gastrointestinal:  Negative for abdominal pain, constipation, diarrhea, nausea and vomiting.   Genitourinary:  Negative for difficulty urinating and frequency.   Musculoskeletal:  Positive for arthralgias. Negative for back pain.   Skin:  Negative for rash.   Neurological:  Negative for dizziness, seizures and headaches.   Psychiatric/Behavioral:  Negative for agitation. The patient is not nervous/anxious.        Objective  There were no vitals taken for this visit.    Physical Exam  Vitals reviewed.   Constitutional:       General: She is not in acute distress.  HENT:      Mouth/Throat:      Mouth: Mucous membranes are moist.   Cardiovascular:      Rate and Rhythm: Normal rate and regular rhythm.      Heart sounds: Normal heart sounds.   Pulmonary:      Breath sounds: Normal breath sounds. No rales.   Abdominal:      General: Bowel sounds are normal.      Palpations: Abdomen is soft.      Tenderness: There is no abdominal tenderness.   Musculoskeletal:         General: No swelling or tenderness.      Cervical back: Neck supple. No tenderness.      Right lower leg: Edema present.      Left lower leg: Edema present.   Skin:     General: Skin is warm.   Neurological:      General: No focal deficit present.      Mental Status: She is alert.   Psychiatric:         Behavior: Behavior normal.          Assessment/Plan  Problem List Items Addressed This Visit       Anxiety    Dementia without behavioral disturbance (Multi)    Essential hypertension - Primary     Will monitor BP         Osteoarthritis    Dizziness        Goals    None           Electronically Signed By: Swetha Garcia MD   5/18/24  8:09 PM

## 2024-05-14 ASSESSMENT — ENCOUNTER SYMPTOMS
FREQUENCY: 0
BACK PAIN: 0
PALPITATIONS: 0
COUGH: 0
DIARRHEA: 0
FEVER: 0
NAUSEA: 0
DIFFICULTY URINATING: 0
CONSTIPATION: 0
UNEXPECTED WEIGHT CHANGE: 0
NERVOUS/ANXIOUS: 0
DIZZINESS: 0
VOMITING: 0
HEADACHES: 0
ABDOMINAL PAIN: 0
SEIZURES: 0
ARTHRALGIAS: 1
AGITATION: 0
SHORTNESS OF BREATH: 0
SORE THROAT: 0

## 2024-05-14 NOTE — PROGRESS NOTES
Subjective   Patient ID: Jailyn Dai is a 89 y.o. female who is long term resident being seen and evaluated for multiple medical problems.    Alert, pleasant, forgetful. Denies pain. Ambulates short distances with walker. Denies recent falls. Compliant with medications. Appetite is good           Review of Systems   Constitutional:  Negative for fever and unexpected weight change.   HENT:  Negative for sore throat.    Respiratory:  Negative for cough and shortness of breath.    Cardiovascular:  Negative for chest pain and palpitations.   Gastrointestinal:  Negative for abdominal pain, constipation, diarrhea, nausea and vomiting.   Genitourinary:  Negative for difficulty urinating and frequency.   Musculoskeletal:  Positive for arthralgias. Negative for back pain.   Skin:  Negative for rash.   Neurological:  Negative for dizziness, seizures and headaches.   Psychiatric/Behavioral:  Negative for agitation. The patient is not nervous/anxious.        Objective   There were no vitals taken for this visit.    Physical Exam  Vitals reviewed.   Constitutional:       General: She is not in acute distress.  HENT:      Mouth/Throat:      Mouth: Mucous membranes are moist.   Cardiovascular:      Rate and Rhythm: Normal rate and regular rhythm.      Heart sounds: Normal heart sounds.   Pulmonary:      Breath sounds: Normal breath sounds. No rales.   Abdominal:      General: Bowel sounds are normal.      Palpations: Abdomen is soft.      Tenderness: There is no abdominal tenderness.   Musculoskeletal:         General: No swelling or tenderness.      Cervical back: Neck supple. No tenderness.      Right lower leg: Edema present.      Left lower leg: Edema present.   Skin:     General: Skin is warm.   Neurological:      General: No focal deficit present.      Mental Status: She is alert.   Psychiatric:         Behavior: Behavior normal.         Assessment/Plan   Problem List Items Addressed This Visit       Anxiety    Dementia  without behavioral disturbance (Multi)    Essential hypertension - Primary     Will monitor BP         Osteoarthritis    Dizziness        Goals    None

## 2024-05-23 ENCOUNTER — APPOINTMENT (OUTPATIENT)
Dept: CARDIOLOGY | Facility: CLINIC | Age: 89
End: 2024-05-23
Payer: MEDICARE

## 2024-06-04 ENCOUNTER — NURSING HOME VISIT (OUTPATIENT)
Dept: POST ACUTE CARE | Facility: EXTERNAL LOCATION | Age: 89
End: 2024-06-04
Payer: MEDICARE

## 2024-06-04 DIAGNOSIS — K21.9 GASTROESOPHAGEAL REFLUX DISEASE WITHOUT ESOPHAGITIS: ICD-10-CM

## 2024-06-04 DIAGNOSIS — E53.8 VITAMIN B12 DEFICIENCY: Primary | ICD-10-CM

## 2024-06-04 DIAGNOSIS — M15.9 PRIMARY OSTEOARTHRITIS INVOLVING MULTIPLE JOINTS: ICD-10-CM

## 2024-06-04 DIAGNOSIS — I10 ESSENTIAL HYPERTENSION: ICD-10-CM

## 2024-06-04 PROCEDURE — 99308 SBSQ NF CARE LOW MDM 20: CPT | Performed by: INTERNAL MEDICINE

## 2024-06-04 NOTE — LETTER
Patient: Jailyn Dai  : 1930    Encounter Date: 2024    Subjective  Patient ID: Jailyn Dai is a 89 y.o. female who is long term resident being seen and evaluated for multiple medical problems.    Alert, pleasant, forgetful. Denies pain and shortness of breath.  Cough improved.  She ambulates short distances with walker. Denies recent falls. Compliant with medications.         Review of Systems   Constitutional:  Negative for fever and unexpected weight change.   HENT:  Negative for sore throat.    Respiratory:  Negative for cough and shortness of breath.    Cardiovascular:  Negative for chest pain and palpitations.   Gastrointestinal:  Negative for abdominal pain, constipation, diarrhea, nausea and vomiting.   Genitourinary:  Negative for difficulty urinating and frequency.   Musculoskeletal:  Positive for arthralgias. Negative for back pain.   Skin:  Negative for rash.   Neurological:  Negative for dizziness, seizures and headaches.   Psychiatric/Behavioral:  Negative for agitation. The patient is not nervous/anxious.        Objective  There were no vitals taken for this visit.    Physical Exam  Vitals reviewed.   Constitutional:       General: She is not in acute distress.  HENT:      Mouth/Throat:      Mouth: Mucous membranes are moist.   Cardiovascular:      Rate and Rhythm: Normal rate and regular rhythm.      Heart sounds: Normal heart sounds.   Pulmonary:      Breath sounds: Normal breath sounds. No rales.   Abdominal:      General: Bowel sounds are normal.      Palpations: Abdomen is soft.      Tenderness: There is no abdominal tenderness.   Musculoskeletal:         General: No swelling or tenderness.      Cervical back: Neck supple. No tenderness.      Right lower leg: Edema present.      Left lower leg: Edema present.   Skin:     General: Skin is warm.   Neurological:      General: No focal deficit present.      Mental Status: She is alert.         Assessment/Plan  Problem List Items Addressed  This Visit       Essential hypertension     Will monitor BP         Gastroesophageal reflux disease without esophagitis    Osteoarthritis    Vitamin B12 deficiency - Primary        Goals    None     Continue fall precautions, supportive care      Electronically Signed By: Swetha Garcia MD   7/22/24  9:03 AM

## 2024-07-09 ENCOUNTER — APPOINTMENT (OUTPATIENT)
Dept: CARDIOLOGY | Facility: CLINIC | Age: 89
End: 2024-07-09
Payer: MEDICARE

## 2024-07-09 ENCOUNTER — NURSING HOME VISIT (OUTPATIENT)
Dept: POST ACUTE CARE | Facility: EXTERNAL LOCATION | Age: 89
End: 2024-07-09

## 2024-07-09 DIAGNOSIS — I50.32 CHRONIC HEART FAILURE WITH PRESERVED EJECTION FRACTION (HFPEF) (MULTI): Primary | ICD-10-CM

## 2024-07-09 DIAGNOSIS — I10 ESSENTIAL HYPERTENSION: ICD-10-CM

## 2024-07-09 DIAGNOSIS — E53.8 VITAMIN B12 DEFICIENCY: ICD-10-CM

## 2024-07-09 DIAGNOSIS — M15.9 PRIMARY OSTEOARTHRITIS INVOLVING MULTIPLE JOINTS: ICD-10-CM

## 2024-07-09 PROCEDURE — 99308 SBSQ NF CARE LOW MDM 20: CPT | Performed by: INTERNAL MEDICINE

## 2024-07-09 NOTE — LETTER
Patient: Jailyn Dai  : 1930    Encounter Date: 2024    Subjective  Patient ID: Jailyn Dai is a 89 y.o. female who is long term resident being seen and evaluated for multiple medical problems.    Patient is alert, pleasant, and forgetful. She has no pain or shortness of breath, c/o occasional cough, improved.  She ambulates short distances with walker. No recent falls. She is compliant with medications.  Upcoming appointment with Dr. Camarillo         Review of Systems   Constitutional:  Negative for fever and unexpected weight change.   HENT:  Negative for sore throat.    Respiratory:  Negative for cough and shortness of breath.    Cardiovascular:  Negative for chest pain and palpitations.   Gastrointestinal:  Negative for abdominal pain, constipation, diarrhea, nausea and vomiting.   Genitourinary:  Negative for difficulty urinating and frequency.   Musculoskeletal:  Positive for arthralgias. Negative for back pain.   Skin:  Negative for rash.   Neurological:  Negative for dizziness, seizures and headaches.   Psychiatric/Behavioral:  Negative for agitation. The patient is not nervous/anxious.        Objective  There were no vitals taken for this visit.    Physical Exam  Vitals reviewed.   Constitutional:       General: She is not in acute distress.  HENT:      Mouth/Throat:      Mouth: Mucous membranes are moist.   Cardiovascular:      Rate and Rhythm: Normal rate and regular rhythm.      Heart sounds: Normal heart sounds.   Pulmonary:      Breath sounds: Normal breath sounds. No rales.   Abdominal:      General: Bowel sounds are normal.      Palpations: Abdomen is soft.      Tenderness: There is no abdominal tenderness.   Musculoskeletal:         General: No swelling or tenderness.      Cervical back: Neck supple. No tenderness.      Right lower leg: Edema present.      Left lower leg: Edema present.   Skin:     General: Skin is warm.   Neurological:      General: No focal deficit present.      Mental  Status: She is alert.         Assessment/Plan  Problem List Items Addressed This Visit       Chronic heart failure with preserved ejection fraction (HFpEF) (Multi) - Primary    Essential hypertension     Will monitor BP         Osteoarthritis    Vitamin B12 deficiency          Goals    None     Continue fall precautions, supportive care      Electronically Signed By: Swetha Garcia MD   7/23/24  6:27 PM

## 2024-07-11 ASSESSMENT — ENCOUNTER SYMPTOMS
CONSTIPATION: 0
SHORTNESS OF BREATH: 0
UNEXPECTED WEIGHT CHANGE: 0
DIFFICULTY URINATING: 0
SEIZURES: 0
HEADACHES: 0
FEVER: 0
AGITATION: 0
ARTHRALGIAS: 1
ABDOMINAL PAIN: 0
SORE THROAT: 0
DIARRHEA: 0
NAUSEA: 0
VOMITING: 0
PALPITATIONS: 0
COUGH: 0
FREQUENCY: 0
DIZZINESS: 0
NERVOUS/ANXIOUS: 0
BACK PAIN: 0

## 2024-07-11 NOTE — PROGRESS NOTES
Subjective   Patient ID: Jailyn Dai is a 89 y.o. female who is long term resident being seen and evaluated for multiple medical problems.    Alert, pleasant, forgetful. Denies pain and shortness of breath.  Cough improved.  She ambulates short distances with walker. Denies recent falls. Compliant with medications.         Review of Systems   Constitutional:  Negative for fever and unexpected weight change.   HENT:  Negative for sore throat.    Respiratory:  Negative for cough and shortness of breath.    Cardiovascular:  Negative for chest pain and palpitations.   Gastrointestinal:  Negative for abdominal pain, constipation, diarrhea, nausea and vomiting.   Genitourinary:  Negative for difficulty urinating and frequency.   Musculoskeletal:  Positive for arthralgias. Negative for back pain.   Skin:  Negative for rash.   Neurological:  Negative for dizziness, seizures and headaches.   Psychiatric/Behavioral:  Negative for agitation. The patient is not nervous/anxious.        Objective   There were no vitals taken for this visit.    Physical Exam  Vitals reviewed.   Constitutional:       General: She is not in acute distress.  HENT:      Mouth/Throat:      Mouth: Mucous membranes are moist.   Cardiovascular:      Rate and Rhythm: Normal rate and regular rhythm.      Heart sounds: Normal heart sounds.   Pulmonary:      Breath sounds: Normal breath sounds. No rales.   Abdominal:      General: Bowel sounds are normal.      Palpations: Abdomen is soft.      Tenderness: There is no abdominal tenderness.   Musculoskeletal:         General: No swelling or tenderness.      Cervical back: Neck supple. No tenderness.      Right lower leg: Edema present.      Left lower leg: Edema present.   Skin:     General: Skin is warm.   Neurological:      General: No focal deficit present.      Mental Status: She is alert.         Assessment/Plan   Problem List Items Addressed This Visit       Essential hypertension     Will monitor BP          Gastroesophageal reflux disease without esophagitis    Osteoarthritis    Vitamin B12 deficiency - Primary        Goals    None     Continue fall precautions, supportive care

## 2024-07-19 ENCOUNTER — APPOINTMENT (OUTPATIENT)
Dept: CARDIOLOGY | Facility: CLINIC | Age: 89
End: 2024-07-19
Payer: MEDICARE

## 2024-07-19 VITALS
BODY MASS INDEX: 35.87 KG/M2 | RESPIRATION RATE: 18 BRPM | DIASTOLIC BLOOD PRESSURE: 80 MMHG | WEIGHT: 190 LBS | HEIGHT: 61 IN | SYSTOLIC BLOOD PRESSURE: 134 MMHG | OXYGEN SATURATION: 94 % | HEART RATE: 52 BPM | TEMPERATURE: 98 F

## 2024-07-19 DIAGNOSIS — I50.32 CHRONIC HEART FAILURE WITH PRESERVED EJECTION FRACTION (HFPEF) (MULTI): ICD-10-CM

## 2024-07-19 DIAGNOSIS — R07.89 CHEST DISCOMFORT: ICD-10-CM

## 2024-07-19 DIAGNOSIS — Z86.79 HISTORY OF ATRIAL FIBRILLATION: ICD-10-CM

## 2024-07-19 DIAGNOSIS — E78.2 MIXED HYPERLIPIDEMIA: ICD-10-CM

## 2024-07-19 DIAGNOSIS — I10 ESSENTIAL HYPERTENSION: Primary | ICD-10-CM

## 2024-07-19 DIAGNOSIS — I20.9 ANGINA PECTORIS (CMS-HCC): ICD-10-CM

## 2024-07-19 DIAGNOSIS — I50.30 DIASTOLIC HEART FAILURE, UNSPECIFIED HF CHRONICITY (MULTI): ICD-10-CM

## 2024-07-19 PROCEDURE — 99214 OFFICE O/P EST MOD 30 MIN: CPT | Performed by: INTERNAL MEDICINE

## 2024-07-19 PROCEDURE — 1160F RVW MEDS BY RX/DR IN RCRD: CPT | Performed by: INTERNAL MEDICINE

## 2024-07-19 PROCEDURE — 3075F SYST BP GE 130 - 139MM HG: CPT | Performed by: INTERNAL MEDICINE

## 2024-07-19 PROCEDURE — 1126F AMNT PAIN NOTED NONE PRSNT: CPT | Performed by: INTERNAL MEDICINE

## 2024-07-19 PROCEDURE — 3079F DIAST BP 80-89 MM HG: CPT | Performed by: INTERNAL MEDICINE

## 2024-07-19 PROCEDURE — 1159F MED LIST DOCD IN RCRD: CPT | Performed by: INTERNAL MEDICINE

## 2024-07-19 ASSESSMENT — PATIENT HEALTH QUESTIONNAIRE - PHQ9
1. LITTLE INTEREST OR PLEASURE IN DOING THINGS: NOT AT ALL
2. FEELING DOWN, DEPRESSED OR HOPELESS: NOT AT ALL
SUM OF ALL RESPONSES TO PHQ9 QUESTIONS 1 AND 2: 0

## 2024-07-19 ASSESSMENT — LIFESTYLE VARIABLES
SKIP TO QUESTIONS 9-10: 1
HOW MANY STANDARD DRINKS CONTAINING ALCOHOL DO YOU HAVE ON A TYPICAL DAY: PATIENT DOES NOT DRINK
AUDIT TOTAL SCORE: 0
HOW OFTEN DO YOU HAVE A DRINK CONTAINING ALCOHOL: NEVER
HOW OFTEN DO YOU HAVE SIX OR MORE DRINKS ON ONE OCCASION: NEVER
HAS A RELATIVE, FRIEND, DOCTOR, OR ANOTHER HEALTH PROFESSIONAL EXPRESSED CONCERN ABOUT YOUR DRINKING OR SUGGESTED YOU CUT DOWN: NO
AUDIT-C TOTAL SCORE: 0
HAVE YOU OR SOMEONE ELSE BEEN INJURED AS A RESULT OF YOUR DRINKING: NO

## 2024-07-19 ASSESSMENT — ENCOUNTER SYMPTOMS
OCCASIONAL FEELINGS OF UNSTEADINESS: 0
DEPRESSION: 0
LOSS OF SENSATION IN FEET: 0

## 2024-07-19 ASSESSMENT — PAIN SCALES - GENERAL: PAINLEVEL: 0-NO PAIN

## 2024-07-19 NOTE — PROGRESS NOTES
History of present illness:  This is a very pleasant 89-year-old functional female patient he follow-up in my office on history of heart failure diastolic dysfunction. Patient is reasonably functional had a stress test last year showed no ischemia ejection fraction preserved on the 2 D echo mild valvular aortic calcification..Patient currently at the nursing home.  Patient returns to my office for follow-up.  Has been feeling overall okay.  Still complaining of lower extremity edema that improved with Farxiga and the Ace wraps.    Past Medical History:   Diagnosis Date    Angina pectoris (CMS-HCC) 11/13/2023    Anxiety     Messina's esophagus     CAD (coronary artery disease)     CHF (congestive heart failure) (Multi)     Dementia (Multi)     Disability of walking 10/17/2023    Dizziness 11/13/2023    Essential hypertension     GERD (gastroesophageal reflux disease)     Lightheadedness 11/13/2023    Lumbar radiculopathy 10/17/2023    Mixed hyperlipidemia 10/17/2023    Shortness of breath 11/13/2023       Past Surgical History:   Procedure Laterality Date    TOTAL HIP ARTHROPLASTY Right        Allergies   Allergen Reactions    Meclizine Hcl Dizziness        reports that she has never smoked. She has never been exposed to tobacco smoke. She has never used smokeless tobacco. She reports that she does not drink alcohol and does not use drugs.    No family history on file.    Patient's Medications   New Prescriptions    No medications on file   Previous Medications    ACETAMINOPHEN (TYLENOL) 500 MG TABLET    Take by mouth.    ALBUTEROL 90 MCG/ACTUATION INHALER        ASPIRIN (CALLY LOW DOSE ASPIRIN) 81 MG EC TABLET    Take 1 tablet (81 mg) by mouth once daily.    BUPROPION (WELLBUTRIN) 75 MG TABLET        CALCIUM CARBONATE-VITAMIN D3 ORAL    Take by mouth.    CHOLECALCIFEROL (VITAMIN D-3) 25 MCG (1000 UT) CAPSULE    Take 2 capsules (50 mcg) by mouth once daily.    CYANOCOBALAMIN (VITAMIN B-12) 1,000 MCG TABLET    Take 1  tablet (1,000 mcg) by mouth once daily.    DAPAGLIFLOZIN PROPANEDIOL (FARXIGA) 10 MG    Take 1 tablet (10 mg) by mouth once daily.    DICLOFENAC SODIUM (VOLTAREN) 1 % GEL        DOCUSATE SODIUM (COLACE) 100 MG CAPSULE    Take 1 capsule (100 mg) by mouth once daily.    DULOXETINE (CYMBALTA) 20 MG DR CAPSULE    Take 1 capsule (20 mg) by mouth once daily.    FLUTICASONE (FLONASE) 50 MCG/ACTUATION NASAL SPRAY        FLUTICASONE-UMECLIDIN-VILANTER (TRELEGY-ELLIPTA) 100-62.5-25 MCG BLISTER WITH DEVICE    Inhale 1 puff once daily.    FUROSEMIDE (LASIX) 40 MG TABLET    Take 2 tablets (80 mg) by mouth once every 24 hours.    HYDROCODONE-ACETAMINOPHEN (NORCO) 5-325 MG TABLET    Take by mouth.    ISOSORBIDE MONONITRATE ER (IMDUR) 30 MG 24 HR TABLET    Take 1 tablet (30 mg) by mouth once every 24 hours.    LEVOTHYROXINE (EUTHYROX) 50 MCG TABLET    Take 1 tablet (50 mcg) by mouth once every 24 hours.    LORAZEPAM (ATIVAN) 0.5 MG TABLET    Take by mouth.    LOSARTAN (COZAAR) 50 MG TABLET    Take 1 tablet (50 mg) by mouth once every 24 hours.    LOSARTAN-HYDROCHLOROTHIAZIDE (HYZAAR) 50-12.5 MG TABLET    Take by mouth.    OMEPRAZOLE (PRILOSEC) 40 MG DR CAPSULE    Take 1 capsule (40 mg) by mouth once daily in the morning. Take before meals.    PANTOPRAZOLE (PROTONIX) 40 MG EC TABLET    Take by mouth.    PERMETHRIN (ELIMITE) 5 % CREAM        PRAVASTATIN (PRAVACHOL) 40 MG TABLET    Take 1 tablet (40 mg) by mouth once every 24 hours.    SERTRALINE (ZOLOFT) 25 MG TABLET    Take by mouth.    SPIRONOLACTONE (ALDACTONE) 25 MG TABLET    Take 1 tablet (25 mg) by mouth once every 24 hours.    TRAMADOL (ULTRAM) 50 MG TABLET    Take 1 tablet (50 mg) by mouth 2 times a day.    VIT A/VIT C/VIT E/ZINC/COPPER (ICAPS AREDS ORAL)    Take 1 capsule by mouth once daily.    VITAMIN E MIXED 400 UNIT TABLET    Take 1 tablet by mouth once daily.   Modified Medications    No medications on file   Discontinued Medications    No medications on file        Objective   Physical Exam  General: Patient in no acute distress   HEENT: Atraumatic normocephalic.  Neck: Supple, jugular venous pressure within normal limit.  No bruits  Lungs: Clear to auscultation bilaterally  Cardiovascular: Regular rate and rhythm, normal heart sounds, no murmurs rubs or gallops  Abdomen: Soft nontender nondistended.  Normal bowel sounds.  Extremities: Warm to touch, no edema.      Lab Review   No visits with results within 2 Month(s) from this visit.   Latest known visit with results is:   Admission on 04/01/2024, Discharged on 04/01/2024   Component Date Value    WBC 04/01/2024 8.0     nRBC 04/01/2024 0.0     RBC 04/01/2024 3.64 (L)     Hemoglobin 04/01/2024 11.9 (L)     Hematocrit 04/01/2024 36.1     MCV 04/01/2024 99     MCH 04/01/2024 32.7     MCHC 04/01/2024 33.0     RDW 04/01/2024 12.9     Platelets 04/01/2024 229     Neutrophils % 04/01/2024 70.3     Immature Granulocytes %,* 04/01/2024 0.4     Lymphocytes % 04/01/2024 13.8     Monocytes % 04/01/2024 11.8     Eosinophils % 04/01/2024 3.1     Basophils % 04/01/2024 0.6     Neutrophils Absolute 04/01/2024 5.60 (H)     Immature Granulocytes Ab* 04/01/2024 0.03     Lymphocytes Absolute 04/01/2024 1.10     Monocytes Absolute 04/01/2024 0.94 (H)     Eosinophils Absolute 04/01/2024 0.25     Basophils Absolute 04/01/2024 0.05     Glucose 04/01/2024 106 (H)     Sodium 04/01/2024 134     Potassium 04/01/2024 4.9     Chloride 04/01/2024 96 (L)     Bicarbonate 04/01/2024 26     Urea Nitrogen 04/01/2024 33 (H)     Creatinine 04/01/2024 1.50     eGFR 04/01/2024 32 (L)     Calcium 04/01/2024 9.0     Anion Gap 04/01/2024 12     PROBNP 04/01/2024 459     Ventricular Rate 04/01/2024 77     Atrial Rate 04/01/2024 77     UT Interval 04/01/2024 162     QRS Duration 04/01/2024 80     QT Interval 04/01/2024 398     QTC Calculation(Bazett) 04/01/2024 450     P Axis 04/01/2024 55     R Axis 04/01/2024 1     T Axis 04/01/2024 51     QRS Count 04/01/2024  12     Q Onset 04/01/2024 230     P Onset 04/01/2024 149     P Offset 04/01/2024 208     T Offset 04/01/2024 429     QTC Fredericia 04/01/2024 432     Troponin T, High Sensiti* 04/01/2024 40 (HH)     Troponin T, High Sensiti* 04/01/2024 38 (HH)         Assessment/Plan   Patient Active Problem List   Diagnosis    Anxiety    Atherosclerotic heart disease of native coronary artery without angina pectoris    Avascular necrosis of bone of left hip (Multi)    Messina's esophagus    Chronic heart failure with preserved ejection fraction (HFpEF) (Multi)    Dementia without behavioral disturbance (Multi)    Essential hypertension    Gastroesophageal reflux disease without esophagitis    History of atrial fibrillation    Hypothyroidism (acquired)    Osteoarthritis    Benign neoplasm of large bowel    Dizziness    Contusion of hand    Dislocation of shoulder joint    Fall    Frequent falls    Hyponatremia    Internal hemorrhoids    Lower leg edema    Nausea & vomiting    Obesity    Primary osteoarthritis of both hips    Physical debility    Vitamin B12 deficiency    History of colonic polyps    Hip pain    Edema of both lower extremities    Chest discomfort    Cardiac arrhythmia        This is a very pleasant 89-year-old functional female patient he follow-up in my office on history of heart failure diastolic dysfunction. Patient is reasonably functional had a stress test last year showed no ischemia ejection fraction preserved on the 2 D echo mild valvular aortic calcification..Patient currently at the nursing home.  Patient returns to my office for follow-up.  Has been feeling overall okay.  Still complaining of lower extremity edema that improved with Farxiga and the Ace wraps.  Occasional chest tightness.  No dizziness lightheadedness or syncope.  Uses wheelchair most of the time and walker.  Conservative management overall in her care given advanced age and multiple comorbidities.  Will follow-up in 6 months.    Cassandra  MD Marquise

## 2024-07-22 ASSESSMENT — ENCOUNTER SYMPTOMS
FEVER: 0
PALPITATIONS: 0
ARTHRALGIAS: 1
SEIZURES: 0
HEADACHES: 0
NAUSEA: 0
SHORTNESS OF BREATH: 0
FREQUENCY: 0
CONSTIPATION: 0
AGITATION: 0
COUGH: 0
BACK PAIN: 0
DIFFICULTY URINATING: 0
SORE THROAT: 0
DIARRHEA: 0
UNEXPECTED WEIGHT CHANGE: 0
VOMITING: 0
DIZZINESS: 0
ABDOMINAL PAIN: 0
NERVOUS/ANXIOUS: 0

## 2024-07-22 NOTE — PROGRESS NOTES
Subjective   Patient ID: Jailyn Dai is a 89 y.o. female who is long term resident being seen and evaluated for multiple medical problems.    Patient is alert, pleasant, and forgetful. She has no pain or shortness of breath, c/o occasional cough, improved.  She ambulates short distances with walker. No recent falls. She is compliant with medications.  Upcoming appointment with Dr. Camarillo         Review of Systems   Constitutional:  Negative for fever and unexpected weight change.   HENT:  Negative for sore throat.    Respiratory:  Negative for cough and shortness of breath.    Cardiovascular:  Negative for chest pain and palpitations.   Gastrointestinal:  Negative for abdominal pain, constipation, diarrhea, nausea and vomiting.   Genitourinary:  Negative for difficulty urinating and frequency.   Musculoskeletal:  Positive for arthralgias. Negative for back pain.   Skin:  Negative for rash.   Neurological:  Negative for dizziness, seizures and headaches.   Psychiatric/Behavioral:  Negative for agitation. The patient is not nervous/anxious.        Objective   There were no vitals taken for this visit.    Physical Exam  Vitals reviewed.   Constitutional:       General: She is not in acute distress.  HENT:      Mouth/Throat:      Mouth: Mucous membranes are moist.   Cardiovascular:      Rate and Rhythm: Normal rate and regular rhythm.      Heart sounds: Normal heart sounds.   Pulmonary:      Breath sounds: Normal breath sounds. No rales.   Abdominal:      General: Bowel sounds are normal.      Palpations: Abdomen is soft.      Tenderness: There is no abdominal tenderness.   Musculoskeletal:         General: No swelling or tenderness.      Cervical back: Neck supple. No tenderness.      Right lower leg: Edema present.      Left lower leg: Edema present.   Skin:     General: Skin is warm.   Neurological:      General: No focal deficit present.      Mental Status: She is alert.         Assessment/Plan   Problem List Items  Addressed This Visit       Chronic heart failure with preserved ejection fraction (HFpEF) (Multi) - Primary    Essential hypertension     Will monitor BP         Osteoarthritis    Vitamin B12 deficiency          Goals    None     Continue fall precautions, supportive care

## 2024-08-06 ENCOUNTER — TRANSCRIBE ORDERS (OUTPATIENT)
Dept: SCHEDULING | Age: 89
End: 2024-08-06

## 2024-08-06 ENCOUNTER — NURSING HOME VISIT (OUTPATIENT)
Dept: POST ACUTE CARE | Facility: EXTERNAL LOCATION | Age: 89
End: 2024-08-06
Payer: MEDICARE

## 2024-08-06 DIAGNOSIS — I50.32 CHRONIC HEART FAILURE WITH PRESERVED EJECTION FRACTION (HFPEF) (MULTI): ICD-10-CM

## 2024-08-06 DIAGNOSIS — I10 ESSENTIAL HYPERTENSION: ICD-10-CM

## 2024-08-06 DIAGNOSIS — R05.3 PERSISTENT COUGH: Primary | ICD-10-CM

## 2024-08-06 DIAGNOSIS — M15.9 PRIMARY OSTEOARTHRITIS INVOLVING MULTIPLE JOINTS: ICD-10-CM

## 2024-08-06 DIAGNOSIS — J44.9 CHRONIC OBSTRUCTIVE PULMONARY DISEASE, UNSPECIFIED (MULTI): Primary | ICD-10-CM

## 2024-08-06 PROCEDURE — 99309 SBSQ NF CARE MODERATE MDM 30: CPT | Performed by: INTERNAL MEDICINE

## 2024-08-06 ASSESSMENT — ENCOUNTER SYMPTOMS
SHORTNESS OF BREATH: 0
COUGH: 1
FEVER: 0
VOMITING: 0
PALPITATIONS: 0
AGITATION: 0
UNEXPECTED WEIGHT CHANGE: 0
ARTHRALGIAS: 1
FREQUENCY: 0
DIZZINESS: 0
BACK PAIN: 0
DIARRHEA: 0
NAUSEA: 0
CONSTIPATION: 0
SEIZURES: 0
NERVOUS/ANXIOUS: 0
DIFFICULTY URINATING: 0
ABDOMINAL PAIN: 0
SORE THROAT: 0
HEADACHES: 0

## 2024-08-06 NOTE — LETTER
Patient: Jailyn Dai  : 1930    Encounter Date: 2024    Subjective  Patient ID: Jailyn Dai is a 89 y.o. female who is long term resident being seen and evaluated for multiple medical problems.    Patient is alert, pleasant, and forgetful. She denies shortness of breath, but is c/o persistent productive cough, not improved with antibiotic.  She uses her inhaler daily.  She has no history of smoking..  She ambulates short distances with walker. No recent falls. She is compliant with medications.           Review of Systems   Constitutional:  Negative for fever and unexpected weight change.   HENT:  Negative for sore throat.    Respiratory:  Positive for cough. Negative for shortness of breath.    Cardiovascular:  Negative for chest pain and palpitations.   Gastrointestinal:  Negative for abdominal pain, constipation, diarrhea, nausea and vomiting.   Genitourinary:  Negative for difficulty urinating and frequency.   Musculoskeletal:  Positive for arthralgias. Negative for back pain.   Skin:  Negative for rash.   Neurological:  Negative for dizziness, seizures and headaches.   Psychiatric/Behavioral:  Negative for agitation. The patient is not nervous/anxious.        Objective  LMP  (LMP Unknown)     Physical Exam  Vitals reviewed.   Constitutional:       General: She is not in acute distress.  HENT:      Mouth/Throat:      Mouth: Mucous membranes are moist.   Cardiovascular:      Rate and Rhythm: Normal rate and regular rhythm.      Heart sounds: Normal heart sounds.   Pulmonary:      Breath sounds: Normal breath sounds. No rales.   Abdominal:      General: Bowel sounds are normal.      Palpations: Abdomen is soft.      Tenderness: There is no abdominal tenderness.   Musculoskeletal:         General: No swelling or tenderness.      Cervical back: Neck supple. No tenderness.      Right lower leg: Edema present.      Left lower leg: Edema present.   Skin:     General: Skin is warm.   Neurological:       General: No focal deficit present.      Mental Status: She is alert.         Assessment/Plan  Problem List Items Addressed This Visit       Chronic heart failure with preserved ejection fraction (HFpEF) (Multi)    Essential hypertension    Osteoarthritis     Other Visit Diagnoses       Persistent cough    -  Primary                 Goals    None     Continue fall precautions, supportive care.  Will arrange for CT chest and pulmonology follow-up      Electronically Signed By: Swetha Garcia MD   8/6/24  8:32 PM

## 2024-08-07 NOTE — PROGRESS NOTES
Subjective   Patient ID: Jailyn Dai is a 89 y.o. female who is long term resident being seen and evaluated for multiple medical problems.    Patient is alert, pleasant, and forgetful. She denies shortness of breath, but is c/o persistent productive cough, not improved with antibiotic.  She uses her inhaler daily.  She has no history of smoking..  She ambulates short distances with walker. No recent falls. She is compliant with medications.           Review of Systems   Constitutional:  Negative for fever and unexpected weight change.   HENT:  Negative for sore throat.    Respiratory:  Positive for cough. Negative for shortness of breath.    Cardiovascular:  Negative for chest pain and palpitations.   Gastrointestinal:  Negative for abdominal pain, constipation, diarrhea, nausea and vomiting.   Genitourinary:  Negative for difficulty urinating and frequency.   Musculoskeletal:  Positive for arthralgias. Negative for back pain.   Skin:  Negative for rash.   Neurological:  Negative for dizziness, seizures and headaches.   Psychiatric/Behavioral:  Negative for agitation. The patient is not nervous/anxious.        Objective   LMP  (LMP Unknown)     Physical Exam  Vitals reviewed.   Constitutional:       General: She is not in acute distress.  HENT:      Mouth/Throat:      Mouth: Mucous membranes are moist.   Cardiovascular:      Rate and Rhythm: Normal rate and regular rhythm.      Heart sounds: Normal heart sounds.   Pulmonary:      Breath sounds: Normal breath sounds. No rales.   Abdominal:      General: Bowel sounds are normal.      Palpations: Abdomen is soft.      Tenderness: There is no abdominal tenderness.   Musculoskeletal:         General: No swelling or tenderness.      Cervical back: Neck supple. No tenderness.      Right lower leg: Edema present.      Left lower leg: Edema present.   Skin:     General: Skin is warm.   Neurological:      General: No focal deficit present.      Mental Status: She is alert.          Assessment/Plan   Problem List Items Addressed This Visit       Chronic heart failure with preserved ejection fraction (HFpEF) (Multi)    Essential hypertension    Osteoarthritis     Other Visit Diagnoses       Persistent cough    -  Primary                 Goals    None     Continue fall precautions, supportive care.  Will arrange for CT chest and pulmonology follow-up

## 2024-08-21 ENCOUNTER — HOSPITAL ENCOUNTER (OUTPATIENT)
Dept: RADIOLOGY | Facility: HOSPITAL | Age: 89
Discharge: HOME | End: 2024-08-21
Payer: MEDICARE

## 2024-08-21 DIAGNOSIS — R05.3 PERSISTENT COUGH: ICD-10-CM

## 2024-08-21 DIAGNOSIS — I50.32 CHRONIC HEART FAILURE WITH PRESERVED EJECTION FRACTION (HFPEF) (MULTI): ICD-10-CM

## 2024-08-21 PROCEDURE — 71250 CT THORAX DX C-: CPT

## 2024-09-03 ENCOUNTER — NURSING HOME VISIT (OUTPATIENT)
Dept: POST ACUTE CARE | Facility: EXTERNAL LOCATION | Age: 89
End: 2024-09-03
Payer: MEDICARE

## 2024-09-03 DIAGNOSIS — I10 ESSENTIAL HYPERTENSION: ICD-10-CM

## 2024-09-03 DIAGNOSIS — I50.32 CHRONIC HEART FAILURE WITH PRESERVED EJECTION FRACTION (HFPEF) (MULTI): ICD-10-CM

## 2024-09-03 DIAGNOSIS — M15.9 PRIMARY OSTEOARTHRITIS INVOLVING MULTIPLE JOINTS: ICD-10-CM

## 2024-09-03 DIAGNOSIS — F41.9 ANXIETY: Primary | ICD-10-CM

## 2024-09-03 PROCEDURE — 99309 SBSQ NF CARE MODERATE MDM 30: CPT | Performed by: INTERNAL MEDICINE

## 2024-09-03 NOTE — LETTER
Patient: Jailyn Dai  : 1930    Encounter Date: 2024    Subjective  Patient ID: Jailyn Dai is a 89 y.o. female who is long term resident being seen and evaluated for multiple medical problems.    Patient is noted to be more anxious, forgetful. She denies shortness of breath. She uses her inhaler daily.  She is c/o bilat hip pain, ambulates short distances with walker. No recent falls. She is compliant with medications.  Had recent CT chest with 7 mm RLL,stable since          Review of Systems   Constitutional:  Negative for fever and unexpected weight change.   HENT:  Negative for sore throat.    Respiratory:  Positive for cough. Negative for shortness of breath.    Cardiovascular:  Negative for chest pain and palpitations.   Gastrointestinal:  Negative for abdominal pain, constipation, diarrhea, nausea and vomiting.   Genitourinary:  Negative for difficulty urinating and frequency.   Musculoskeletal:  Positive for arthralgias. Negative for back pain.   Skin:  Negative for rash.   Neurological:  Negative for dizziness, seizures and headaches.   Psychiatric/Behavioral:  Negative for agitation. The patient is nervous/anxious.        Objective  LMP  (LMP Unknown)     Physical Exam  Vitals reviewed.   Constitutional:       General: She is not in acute distress.  HENT:      Mouth/Throat:      Mouth: Mucous membranes are moist.   Cardiovascular:      Rate and Rhythm: Normal rate and regular rhythm.      Heart sounds: Normal heart sounds.   Pulmonary:      Breath sounds: Normal breath sounds. No rales.   Abdominal:      General: Bowel sounds are normal.      Palpations: Abdomen is soft.      Tenderness: There is no abdominal tenderness.   Musculoskeletal:         General: No swelling or tenderness.      Cervical back: Neck supple. No tenderness.      Right lower leg: Edema present.      Left lower leg: Edema present.   Skin:     General: Skin is warm.   Neurological:      General: No focal deficit present.       Mental Status: She is alert.         Assessment/Plan  Problem List Items Addressed This Visit       Anxiety - Primary    Chronic heart failure with preserved ejection fraction (HFpEF) (Multi)    Essential hypertension    Osteoarthritis              Goals    None     Will check labs and UA. Continue fall precautions, supportive care.       Electronically Signed By: Swetha Garcia MD   9/9/24 10:59 PM

## 2024-09-09 ASSESSMENT — ENCOUNTER SYMPTOMS
FEVER: 0
ABDOMINAL PAIN: 0
DIZZINESS: 0
HEADACHES: 0
COUGH: 1
SHORTNESS OF BREATH: 0
CONSTIPATION: 0
NERVOUS/ANXIOUS: 1
DIARRHEA: 0
ARTHRALGIAS: 1
FREQUENCY: 0
NAUSEA: 0
SORE THROAT: 0
DIFFICULTY URINATING: 0
VOMITING: 0
UNEXPECTED WEIGHT CHANGE: 0
SEIZURES: 0
AGITATION: 0
PALPITATIONS: 0
BACK PAIN: 0

## 2024-09-10 ENCOUNTER — NURSING HOME VISIT (OUTPATIENT)
Dept: POST ACUTE CARE | Facility: EXTERNAL LOCATION | Age: 89
End: 2024-09-10
Payer: MEDICARE

## 2024-09-10 DIAGNOSIS — K21.9 GASTROESOPHAGEAL REFLUX DISEASE WITHOUT ESOPHAGITIS: ICD-10-CM

## 2024-09-10 DIAGNOSIS — I50.32 CHRONIC HEART FAILURE WITH PRESERVED EJECTION FRACTION (HFPEF): Primary | ICD-10-CM

## 2024-09-10 DIAGNOSIS — I10 ESSENTIAL HYPERTENSION: ICD-10-CM

## 2024-09-10 DIAGNOSIS — M15.9 PRIMARY OSTEOARTHRITIS INVOLVING MULTIPLE JOINTS: ICD-10-CM

## 2024-09-10 PROCEDURE — 99308 SBSQ NF CARE LOW MDM 20: CPT | Performed by: INTERNAL MEDICINE

## 2024-09-10 NOTE — LETTER
Patient: Jaliyn Dai  : 1930    Encounter Date: 09/10/2024    Subjective  Patient ID: Jailyn Dai is a 89 y.o. female who is long term resident being seen and evaluated for multiple medical problems.    Patient is alert,, forgetful. She denies shortness of breath, has occasional cough.  She is anxious because her son is having surgery.  She is c/o bilat hip pain, ambulates short distances with walker. No recent falls. She is compliant with medications.  Urine culture was unremarkable.  Labs reviewed, stable, creatinine 1.8         Review of Systems   Constitutional:  Negative for fever and unexpected weight change.   HENT:  Negative for sore throat.    Respiratory:  Positive for cough. Negative for shortness of breath.    Cardiovascular:  Negative for chest pain and palpitations.   Gastrointestinal:  Negative for abdominal pain, constipation, diarrhea, nausea and vomiting.   Genitourinary:  Negative for difficulty urinating and frequency.   Musculoskeletal:  Positive for arthralgias. Negative for back pain.   Skin:  Negative for rash.   Neurological:  Negative for dizziness, seizures and headaches.   Psychiatric/Behavioral:  Negative for agitation. The patient is nervous/anxious.        Objective  LMP  (LMP Unknown)     Physical Exam  Vitals reviewed.   Constitutional:       General: She is not in acute distress.  HENT:      Mouth/Throat:      Mouth: Mucous membranes are moist.   Cardiovascular:      Rate and Rhythm: Normal rate and regular rhythm.      Heart sounds: Normal heart sounds.   Pulmonary:      Breath sounds: Normal breath sounds. No rales.   Abdominal:      General: Bowel sounds are normal.      Palpations: Abdomen is soft.      Tenderness: There is no abdominal tenderness.   Musculoskeletal:         General: No swelling or tenderness.      Cervical back: Neck supple. No tenderness.      Right lower leg: Edema present.      Left lower leg: Edema present.   Skin:     General: Skin is warm.    Neurological:      General: No focal deficit present.      Mental Status: She is alert.         Assessment/Plan  Problem List Items Addressed This Visit       Chronic heart failure with preserved ejection fraction (HFpEF) (Multi) - Primary    Essential hypertension    Gastroesophageal reflux disease without esophagitis    Osteoarthritis                Goals    None     Will continue fall precautions, supportive care.  Follow-up with nephrologist      Electronically Signed By: Swetha Garcia MD   9/16/24  8:58 AM

## 2024-09-10 NOTE — PROGRESS NOTES
Subjective   Patient ID: Jailyn Dai is a 89 y.o. female who is long term resident being seen and evaluated for multiple medical problems.    Patient is noted to be more anxious, forgetful. She denies shortness of breath. She uses her inhaler daily.  She is c/o bilat hip pain, ambulates short distances with walker. No recent falls. She is compliant with medications.  Had recent CT chest with 7 mm RLL,stable since 2011         Review of Systems   Constitutional:  Negative for fever and unexpected weight change.   HENT:  Negative for sore throat.    Respiratory:  Positive for cough. Negative for shortness of breath.    Cardiovascular:  Negative for chest pain and palpitations.   Gastrointestinal:  Negative for abdominal pain, constipation, diarrhea, nausea and vomiting.   Genitourinary:  Negative for difficulty urinating and frequency.   Musculoskeletal:  Positive for arthralgias. Negative for back pain.   Skin:  Negative for rash.   Neurological:  Negative for dizziness, seizures and headaches.   Psychiatric/Behavioral:  Negative for agitation. The patient is nervous/anxious.        Objective   LMP  (LMP Unknown)     Physical Exam  Vitals reviewed.   Constitutional:       General: She is not in acute distress.  HENT:      Mouth/Throat:      Mouth: Mucous membranes are moist.   Cardiovascular:      Rate and Rhythm: Normal rate and regular rhythm.      Heart sounds: Normal heart sounds.   Pulmonary:      Breath sounds: Normal breath sounds. No rales.   Abdominal:      General: Bowel sounds are normal.      Palpations: Abdomen is soft.      Tenderness: There is no abdominal tenderness.   Musculoskeletal:         General: No swelling or tenderness.      Cervical back: Neck supple. No tenderness.      Right lower leg: Edema present.      Left lower leg: Edema present.   Skin:     General: Skin is warm.   Neurological:      General: No focal deficit present.      Mental Status: She is alert.         Assessment/Plan    Problem List Items Addressed This Visit       Anxiety - Primary    Chronic heart failure with preserved ejection fraction (HFpEF) (Multi)    Essential hypertension    Osteoarthritis              Goals    None     Will check labs and UA. Continue fall precautions, supportive care.

## 2024-09-16 ASSESSMENT — ENCOUNTER SYMPTOMS
NAUSEA: 0
SORE THROAT: 0
SHORTNESS OF BREATH: 0
HEADACHES: 0
CONSTIPATION: 0
DIZZINESS: 0
NERVOUS/ANXIOUS: 1
PALPITATIONS: 0
ARTHRALGIAS: 1
COUGH: 1
DIFFICULTY URINATING: 0
AGITATION: 0
FREQUENCY: 0
UNEXPECTED WEIGHT CHANGE: 0
DIARRHEA: 0
ABDOMINAL PAIN: 0
VOMITING: 0
SEIZURES: 0
FEVER: 0
BACK PAIN: 0

## 2024-09-16 NOTE — PROGRESS NOTES
Subjective   Patient ID: Jailyn Dai is a 89 y.o. female who is long term resident being seen and evaluated for multiple medical problems.    Patient is alert,, forgetful. She denies shortness of breath, has occasional cough.  She is anxious because her son is having surgery.  She is c/o bilat hip pain, ambulates short distances with walker. No recent falls. She is compliant with medications.  Urine culture was unremarkable.  Labs reviewed, stable, creatinine 1.8         Review of Systems   Constitutional:  Negative for fever and unexpected weight change.   HENT:  Negative for sore throat.    Respiratory:  Positive for cough. Negative for shortness of breath.    Cardiovascular:  Negative for chest pain and palpitations.   Gastrointestinal:  Negative for abdominal pain, constipation, diarrhea, nausea and vomiting.   Genitourinary:  Negative for difficulty urinating and frequency.   Musculoskeletal:  Positive for arthralgias. Negative for back pain.   Skin:  Negative for rash.   Neurological:  Negative for dizziness, seizures and headaches.   Psychiatric/Behavioral:  Negative for agitation. The patient is nervous/anxious.        Objective   LMP  (LMP Unknown)     Physical Exam  Vitals reviewed.   Constitutional:       General: She is not in acute distress.  HENT:      Mouth/Throat:      Mouth: Mucous membranes are moist.   Cardiovascular:      Rate and Rhythm: Normal rate and regular rhythm.      Heart sounds: Normal heart sounds.   Pulmonary:      Breath sounds: Normal breath sounds. No rales.   Abdominal:      General: Bowel sounds are normal.      Palpations: Abdomen is soft.      Tenderness: There is no abdominal tenderness.   Musculoskeletal:         General: No swelling or tenderness.      Cervical back: Neck supple. No tenderness.      Right lower leg: Edema present.      Left lower leg: Edema present.   Skin:     General: Skin is warm.   Neurological:      General: No focal deficit present.      Mental Status:  She is alert.         Assessment/Plan   Problem List Items Addressed This Visit       Chronic heart failure with preserved ejection fraction (HFpEF) (Multi) - Primary    Essential hypertension    Gastroesophageal reflux disease without esophagitis    Osteoarthritis                Goals    None     Will continue fall precautions, supportive care.  Follow-up with nephrologist

## 2024-10-01 ENCOUNTER — NURSING HOME VISIT (OUTPATIENT)
Dept: POST ACUTE CARE | Facility: EXTERNAL LOCATION | Age: 89
End: 2024-10-01
Payer: MEDICARE

## 2024-10-01 DIAGNOSIS — E78.2 MIXED HYPERLIPIDEMIA: ICD-10-CM

## 2024-10-01 DIAGNOSIS — M15.0 PRIMARY OSTEOARTHRITIS INVOLVING MULTIPLE JOINTS: ICD-10-CM

## 2024-10-01 DIAGNOSIS — Z86.79 HISTORY OF ATRIAL FIBRILLATION: ICD-10-CM

## 2024-10-01 DIAGNOSIS — E03.9 HYPOTHYROIDISM (ACQUIRED): Primary | ICD-10-CM

## 2024-10-01 PROCEDURE — 99308 SBSQ NF CARE LOW MDM 20: CPT | Performed by: INTERNAL MEDICINE

## 2024-10-01 NOTE — LETTER
Patient: Jailyn Dai  : 1930    Encounter Date: 10/01/2024    Subjective  Patient ID: Jailyn Dai is a 89 y.o. female who is long term resident being seen and evaluated for multiple medical problems.    Patient is alert,, forgetful. She denies shortness of breath, has occasional cough.  She ambulates short distances with walker. No recent falls. She is compliant with medications.          Review of Systems   Constitutional:  Negative for fever and unexpected weight change.   HENT:  Negative for sore throat.    Respiratory:  Positive for cough. Negative for shortness of breath.    Cardiovascular:  Negative for chest pain and palpitations.   Gastrointestinal:  Negative for abdominal pain, constipation, diarrhea, nausea and vomiting.   Genitourinary:  Negative for difficulty urinating and frequency.   Musculoskeletal:  Positive for arthralgias. Negative for back pain.   Skin:  Negative for rash.   Neurological:  Negative for dizziness, seizures and headaches.   Psychiatric/Behavioral:  Negative for agitation. The patient is nervous/anxious.        Objective  LMP  (LMP Unknown)     Physical Exam  Vitals reviewed.   Constitutional:       General: She is not in acute distress.  HENT:      Mouth/Throat:      Mouth: Mucous membranes are moist.   Cardiovascular:      Rate and Rhythm: Normal rate and regular rhythm.      Heart sounds: Normal heart sounds.   Pulmonary:      Breath sounds: Normal breath sounds. No rales.   Abdominal:      General: Bowel sounds are normal.      Palpations: Abdomen is soft.      Tenderness: There is no abdominal tenderness.   Musculoskeletal:         General: No swelling or tenderness.      Cervical back: Neck supple. No tenderness.      Right lower leg: Edema present.      Left lower leg: Edema present.   Skin:     General: Skin is warm.   Neurological:      General: No focal deficit present.      Mental Status: She is alert.         Assessment/Plan  Problem List Items Addressed This Visit        History of atrial fibrillation    Hypothyroidism (acquired) - Primary    Osteoarthritis     Other Visit Diagnoses       Mixed hyperlipidemia                           Goals    None     Will continue fall precautions, supportive care.        Electronically Signed By: Swetha Garcia MD   10/8/24 10:38 AM

## 2024-10-08 DIAGNOSIS — M15.0 PRIMARY OSTEOARTHRITIS INVOLVING MULTIPLE JOINTS: Primary | ICD-10-CM

## 2024-10-08 RX ORDER — TRAMADOL HYDROCHLORIDE 50 MG/1
50 TABLET ORAL EVERY 8 HOURS PRN
Qty: 90 TABLET | Refills: 5 | Status: SHIPPED | OUTPATIENT
Start: 2024-10-08 | End: 2025-04-06

## 2024-10-08 ASSESSMENT — ENCOUNTER SYMPTOMS
NAUSEA: 0
PALPITATIONS: 0
DIARRHEA: 0
DIZZINESS: 0
FEVER: 0
BACK PAIN: 0
ARTHRALGIAS: 1
COUGH: 1
CONSTIPATION: 0
UNEXPECTED WEIGHT CHANGE: 0
HEADACHES: 0
SORE THROAT: 0
ABDOMINAL PAIN: 0
AGITATION: 0
DIFFICULTY URINATING: 0
VOMITING: 0
SEIZURES: 0
FREQUENCY: 0
NERVOUS/ANXIOUS: 1
SHORTNESS OF BREATH: 0

## 2024-10-08 NOTE — PROGRESS NOTES
Subjective   Patient ID: Jailyn Dai is a 89 y.o. female who is long term resident being seen and evaluated for multiple medical problems.    Patient is alert,, forgetful. She denies shortness of breath, has occasional cough.  She ambulates short distances with walker. No recent falls. She is compliant with medications.          Review of Systems   Constitutional:  Negative for fever and unexpected weight change.   HENT:  Negative for sore throat.    Respiratory:  Positive for cough. Negative for shortness of breath.    Cardiovascular:  Negative for chest pain and palpitations.   Gastrointestinal:  Negative for abdominal pain, constipation, diarrhea, nausea and vomiting.   Genitourinary:  Negative for difficulty urinating and frequency.   Musculoskeletal:  Positive for arthralgias. Negative for back pain.   Skin:  Negative for rash.   Neurological:  Negative for dizziness, seizures and headaches.   Psychiatric/Behavioral:  Negative for agitation. The patient is nervous/anxious.        Objective   LMP  (LMP Unknown)     Physical Exam  Vitals reviewed.   Constitutional:       General: She is not in acute distress.  HENT:      Mouth/Throat:      Mouth: Mucous membranes are moist.   Cardiovascular:      Rate and Rhythm: Normal rate and regular rhythm.      Heart sounds: Normal heart sounds.   Pulmonary:      Breath sounds: Normal breath sounds. No rales.   Abdominal:      General: Bowel sounds are normal.      Palpations: Abdomen is soft.      Tenderness: There is no abdominal tenderness.   Musculoskeletal:         General: No swelling or tenderness.      Cervical back: Neck supple. No tenderness.      Right lower leg: Edema present.      Left lower leg: Edema present.   Skin:     General: Skin is warm.   Neurological:      General: No focal deficit present.      Mental Status: She is alert.         Assessment/Plan   Problem List Items Addressed This Visit       History of atrial fibrillation    Hypothyroidism (acquired)  - Primary    Osteoarthritis     Other Visit Diagnoses       Mixed hyperlipidemia                           Goals    None     Will continue fall precautions, supportive care.

## 2024-10-18 ENCOUNTER — NURSING HOME VISIT (OUTPATIENT)
Dept: POST ACUTE CARE | Facility: EXTERNAL LOCATION | Age: 89
End: 2024-10-18
Payer: MEDICARE

## 2024-10-18 DIAGNOSIS — I60.9 SUBARACHNOID HEMORRHAGE (MULTI): Primary | ICD-10-CM

## 2024-10-18 DIAGNOSIS — I50.32 CHRONIC HEART FAILURE WITH PRESERVED EJECTION FRACTION (HFPEF): ICD-10-CM

## 2024-10-18 DIAGNOSIS — R26.2 DIFFICULTY IN WALKING: ICD-10-CM

## 2024-10-18 DIAGNOSIS — M15.0 PRIMARY OSTEOARTHRITIS INVOLVING MULTIPLE JOINTS: ICD-10-CM

## 2024-10-18 PROCEDURE — 99309 SBSQ NF CARE MODERATE MDM 30: CPT | Performed by: INTERNAL MEDICINE

## 2024-10-18 NOTE — LETTER
Patient: Jailyn Dai  : 1930    Encounter Date: 10/18/2024    Subjective  Patient ID: Jailyn Dai is a 89 y.o. female who is long term resident being seen and evaluated for multiple medical problems.    Patient is noted to be more confused today.  She had a fall with no apparent injury but because of confusion she was sent to ER and diagnosed with small right parietal lobe hemorrhage.  She was observed overnight and then discharge.  She denies headache, dizziness, has no recollection of events.  Denies shortness of breath, has occasional cough.  She ambulates short distances with walker.          Review of Systems   Constitutional:  Negative for fever and unexpected weight change.   HENT:  Negative for sore throat.    Respiratory:  Positive for cough. Negative for shortness of breath.    Cardiovascular:  Negative for chest pain and palpitations.   Gastrointestinal:  Negative for abdominal pain, constipation, diarrhea, nausea and vomiting.   Genitourinary:  Negative for difficulty urinating and frequency.   Musculoskeletal:  Positive for arthralgias. Negative for back pain.   Skin:  Negative for rash.   Neurological:  Negative for dizziness, seizures and headaches.   Psychiatric/Behavioral:  Positive for confusion. Negative for agitation. The patient is nervous/anxious.        Objective  LMP  (LMP Unknown)     Physical Exam  Vitals reviewed.   Constitutional:       General: She is not in acute distress.  HENT:      Mouth/Throat:      Mouth: Mucous membranes are moist.   Cardiovascular:      Rate and Rhythm: Normal rate and regular rhythm.      Heart sounds: Normal heart sounds.   Pulmonary:      Breath sounds: Normal breath sounds. No rales.   Abdominal:      General: Bowel sounds are normal.      Palpations: Abdomen is soft.      Tenderness: There is no abdominal tenderness.   Musculoskeletal:         General: No swelling or tenderness.      Cervical back: Neck supple. No tenderness.      Right lower leg:  Edema present.      Left lower leg: Edema present.   Skin:     General: Skin is warm.   Neurological:      General: No focal deficit present.      Mental Status: She is alert.         Assessment/Plan  Problem List Items Addressed This Visit       Chronic heart failure with preserved ejection fraction (HFpEF)    Osteoarthritis     Other Visit Diagnoses       Subarachnoid hemorrhage (Multi)    -  Primary    Difficulty in walking                             Goals    None     Will continue fall precautions, supportive care.  Will stop aspirin, start physical therapy for fall prevention      Electronically Signed By: Swetha Garcia MD   10/21/24 12:30 PM

## 2024-10-21 ASSESSMENT — ENCOUNTER SYMPTOMS
UNEXPECTED WEIGHT CHANGE: 0
SHORTNESS OF BREATH: 0
ABDOMINAL PAIN: 0
NAUSEA: 0
DIFFICULTY URINATING: 0
CONFUSION: 1
ARTHRALGIAS: 1
AGITATION: 0
BACK PAIN: 0
DIZZINESS: 0
CONSTIPATION: 0
VOMITING: 0
FEVER: 0
NERVOUS/ANXIOUS: 1
FREQUENCY: 0
HEADACHES: 0
SEIZURES: 0
SORE THROAT: 0
PALPITATIONS: 0
DIARRHEA: 0
COUGH: 1

## 2024-10-21 NOTE — PROGRESS NOTES
Subjective   Patient ID: Jailyn Dai is a 89 y.o. female who is long term resident being seen and evaluated for multiple medical problems.    Patient is noted to be more confused today.  She had a fall with no apparent injury but because of confusion she was sent to ER and diagnosed with small right parietal lobe hemorrhage.  She was observed overnight and then discharge.  She denies headache, dizziness, has no recollection of events.  Denies shortness of breath, has occasional cough.  She ambulates short distances with walker.          Review of Systems   Constitutional:  Negative for fever and unexpected weight change.   HENT:  Negative for sore throat.    Respiratory:  Positive for cough. Negative for shortness of breath.    Cardiovascular:  Negative for chest pain and palpitations.   Gastrointestinal:  Negative for abdominal pain, constipation, diarrhea, nausea and vomiting.   Genitourinary:  Negative for difficulty urinating and frequency.   Musculoskeletal:  Positive for arthralgias. Negative for back pain.   Skin:  Negative for rash.   Neurological:  Negative for dizziness, seizures and headaches.   Psychiatric/Behavioral:  Positive for confusion. Negative for agitation. The patient is nervous/anxious.        Objective   LMP  (LMP Unknown)     Physical Exam  Vitals reviewed.   Constitutional:       General: She is not in acute distress.  HENT:      Mouth/Throat:      Mouth: Mucous membranes are moist.   Cardiovascular:      Rate and Rhythm: Normal rate and regular rhythm.      Heart sounds: Normal heart sounds.   Pulmonary:      Breath sounds: Normal breath sounds. No rales.   Abdominal:      General: Bowel sounds are normal.      Palpations: Abdomen is soft.      Tenderness: There is no abdominal tenderness.   Musculoskeletal:         General: No swelling or tenderness.      Cervical back: Neck supple. No tenderness.      Right lower leg: Edema present.      Left lower leg: Edema present.   Skin:     General:  Skin is warm.   Neurological:      General: No focal deficit present.      Mental Status: She is alert.         Assessment/Plan   Problem List Items Addressed This Visit       Chronic heart failure with preserved ejection fraction (HFpEF)    Osteoarthritis     Other Visit Diagnoses       Subarachnoid hemorrhage (Multi)    -  Primary    Difficulty in walking                             Goals    None     Will continue fall precautions, supportive care.  Will stop aspirin, start physical therapy for fall prevention

## 2024-11-05 ENCOUNTER — NURSING HOME VISIT (OUTPATIENT)
Dept: POST ACUTE CARE | Facility: EXTERNAL LOCATION | Age: 89
End: 2024-11-05
Payer: MEDICARE

## 2024-11-05 DIAGNOSIS — Z86.79 HISTORY OF ATRIAL FIBRILLATION: ICD-10-CM

## 2024-11-05 DIAGNOSIS — M15.0 PRIMARY OSTEOARTHRITIS INVOLVING MULTIPLE JOINTS: Primary | ICD-10-CM

## 2024-11-05 DIAGNOSIS — I50.32 CHRONIC HEART FAILURE WITH PRESERVED EJECTION FRACTION (HFPEF): ICD-10-CM

## 2024-11-05 PROCEDURE — 99308 SBSQ NF CARE LOW MDM 20: CPT | Performed by: INTERNAL MEDICINE

## 2024-11-05 NOTE — LETTER
Patient: Jailyn Dai  : 1930    Encounter Date: 2024    Subjective  Patient ID: Jailyn Dai is a 89 y.o. female who is long term resident being seen and evaluated for multiple medical problems.    Patient is alert, pleasant, forgetful.  She is complaining of pain in her knees.  Her appetite is okay.  She had no recent falls.  Denies shortness of breath, has occasional cough.  She ambulates short distances with walker.          Review of Systems   Constitutional:  Negative for fever and unexpected weight change.   HENT:  Negative for sore throat.    Respiratory:  Positive for cough. Negative for shortness of breath.    Cardiovascular:  Negative for chest pain and palpitations.   Gastrointestinal:  Negative for abdominal pain, constipation, diarrhea, nausea and vomiting.   Genitourinary:  Negative for difficulty urinating and frequency.   Musculoskeletal:  Positive for arthralgias. Negative for back pain.   Skin:  Negative for rash.   Neurological:  Negative for dizziness, seizures and headaches.   Psychiatric/Behavioral:  Positive for confusion. Negative for agitation. The patient is nervous/anxious.        Objective  LMP  (LMP Unknown)     Physical Exam  Vitals reviewed.   Constitutional:       General: She is not in acute distress.  HENT:      Mouth/Throat:      Mouth: Mucous membranes are moist.   Cardiovascular:      Rate and Rhythm: Normal rate and regular rhythm.      Heart sounds: Normal heart sounds.   Pulmonary:      Breath sounds: Normal breath sounds. No rales.   Abdominal:      General: Bowel sounds are normal.      Palpations: Abdomen is soft.      Tenderness: There is no abdominal tenderness.   Musculoskeletal:         General: No swelling or tenderness.      Cervical back: Neck supple. No tenderness.      Right lower leg: Edema present.      Left lower leg: Edema present.   Skin:     General: Skin is warm.   Neurological:      General: No focal deficit present.      Mental Status: She is  alert.         Assessment/Plan  Problem List Items Addressed This Visit       Chronic heart failure with preserved ejection fraction (HFpEF)    History of atrial fibrillation    Osteoarthritis - Primary                      Goals    None     Will continue fall precautions, supportive care.  Continue physical therapy for fall prevention      Electronically Signed By: Swetha Garcia MD   11/18/24 10:17 AM

## 2024-11-18 ASSESSMENT — ENCOUNTER SYMPTOMS
COUGH: 1
ARTHRALGIAS: 1
SEIZURES: 0
NERVOUS/ANXIOUS: 1
PALPITATIONS: 0
UNEXPECTED WEIGHT CHANGE: 0
AGITATION: 0
BACK PAIN: 0
FREQUENCY: 0
DIFFICULTY URINATING: 0
SHORTNESS OF BREATH: 0
CONSTIPATION: 0
SORE THROAT: 0
CONFUSION: 1
DIARRHEA: 0
VOMITING: 0
NAUSEA: 0
DIZZINESS: 0
HEADACHES: 0
ABDOMINAL PAIN: 0
FEVER: 0

## 2024-11-18 NOTE — PROGRESS NOTES
Subjective   Patient ID: Jailyn Dai is a 89 y.o. female who is long term resident being seen and evaluated for multiple medical problems.    Patient is alert, pleasant, forgetful.  She is complaining of pain in her knees.  Her appetite is okay.  She had no recent falls.  Denies shortness of breath, has occasional cough.  She ambulates short distances with walker.          Review of Systems   Constitutional:  Negative for fever and unexpected weight change.   HENT:  Negative for sore throat.    Respiratory:  Positive for cough. Negative for shortness of breath.    Cardiovascular:  Negative for chest pain and palpitations.   Gastrointestinal:  Negative for abdominal pain, constipation, diarrhea, nausea and vomiting.   Genitourinary:  Negative for difficulty urinating and frequency.   Musculoskeletal:  Positive for arthralgias. Negative for back pain.   Skin:  Negative for rash.   Neurological:  Negative for dizziness, seizures and headaches.   Psychiatric/Behavioral:  Positive for confusion. Negative for agitation. The patient is nervous/anxious.        Objective   LMP  (LMP Unknown)     Physical Exam  Vitals reviewed.   Constitutional:       General: She is not in acute distress.  HENT:      Mouth/Throat:      Mouth: Mucous membranes are moist.   Cardiovascular:      Rate and Rhythm: Normal rate and regular rhythm.      Heart sounds: Normal heart sounds.   Pulmonary:      Breath sounds: Normal breath sounds. No rales.   Abdominal:      General: Bowel sounds are normal.      Palpations: Abdomen is soft.      Tenderness: There is no abdominal tenderness.   Musculoskeletal:         General: No swelling or tenderness.      Cervical back: Neck supple. No tenderness.      Right lower leg: Edema present.      Left lower leg: Edema present.   Skin:     General: Skin is warm.   Neurological:      General: No focal deficit present.      Mental Status: She is alert.         Assessment/Plan   Problem List Items Addressed This  Visit       Chronic heart failure with preserved ejection fraction (HFpEF)    History of atrial fibrillation    Osteoarthritis - Primary                      Goals    None     Will continue fall precautions, supportive care.  Continue physical therapy for fall prevention

## 2024-11-22 ENCOUNTER — NURSING HOME VISIT (OUTPATIENT)
Dept: POST ACUTE CARE | Facility: EXTERNAL LOCATION | Age: 89
End: 2024-11-22
Payer: MEDICARE

## 2024-11-22 DIAGNOSIS — J20.8 ACUTE BRONCHITIS DUE TO OTHER SPECIFIED ORGANISMS: Primary | ICD-10-CM

## 2024-11-22 DIAGNOSIS — I50.32 CHRONIC HEART FAILURE WITH PRESERVED EJECTION FRACTION (HFPEF): ICD-10-CM

## 2024-11-22 DIAGNOSIS — M15.0 PRIMARY OSTEOARTHRITIS INVOLVING MULTIPLE JOINTS: ICD-10-CM

## 2024-11-22 PROCEDURE — 99309 SBSQ NF CARE MODERATE MDM 30: CPT | Performed by: INTERNAL MEDICINE

## 2024-11-22 NOTE — LETTER
Patient: Jailyn Dai  : 1930    Encounter Date: 2024    Subjective  Patient ID: Jailyn Dai is a 89 y.o. female who is long term resident being seen and evaluated for multiple medical problems.    Patient is alert, pleasant, forgetful.  She is complaining of productive cough, denies shortness of breath.   Her appetite is okay.  She had no recent falls. She ambulates short distances with walker.          Review of Systems   Constitutional:  Negative for fever and unexpected weight change.   HENT:  Negative for sore throat.    Respiratory:  Positive for cough. Negative for shortness of breath.    Cardiovascular:  Negative for chest pain and palpitations.   Gastrointestinal:  Negative for abdominal pain, constipation, diarrhea, nausea and vomiting.   Genitourinary:  Negative for difficulty urinating and frequency.   Musculoskeletal:  Positive for arthralgias. Negative for back pain.   Skin:  Negative for rash.   Neurological:  Negative for dizziness, seizures and headaches.   Psychiatric/Behavioral:  Positive for confusion. Negative for agitation. The patient is nervous/anxious.        Objective  LMP  (LMP Unknown)     Physical Exam  Vitals reviewed.   Constitutional:       General: She is not in acute distress.  HENT:      Mouth/Throat:      Mouth: Mucous membranes are moist.   Cardiovascular:      Rate and Rhythm: Normal rate and regular rhythm.      Heart sounds: Normal heart sounds.   Pulmonary:      Breath sounds: Normal breath sounds. No rales.   Abdominal:      General: Bowel sounds are normal.      Palpations: Abdomen is soft.      Tenderness: There is no abdominal tenderness.   Musculoskeletal:         General: No swelling or tenderness.      Cervical back: Neck supple. No tenderness.      Right lower leg: Edema present.      Left lower leg: Edema present.   Skin:     General: Skin is warm.   Neurological:      General: No focal deficit present.      Mental Status: She is alert.          Assessment/Plan  Problem List Items Addressed This Visit       Chronic heart failure with preserved ejection fraction (HFpEF)    Osteoarthritis     Other Visit Diagnoses       Acute bronchitis due to other specified organisms    -  Primary                             Goals    None     Will check COVID test, start Mucinex, monitor pulse ox, continue fall precautions, supportive care.        Electronically Signed By: Swetha Garcia MD   3/3/25 10:25 AM

## 2024-12-03 ENCOUNTER — NURSING HOME VISIT (OUTPATIENT)
Dept: POST ACUTE CARE | Facility: EXTERNAL LOCATION | Age: 89
End: 2024-12-03
Payer: MEDICARE

## 2024-12-03 DIAGNOSIS — I50.32 CHRONIC HEART FAILURE WITH PRESERVED EJECTION FRACTION (HFPEF): ICD-10-CM

## 2024-12-03 DIAGNOSIS — K21.9 GASTROESOPHAGEAL REFLUX DISEASE WITHOUT ESOPHAGITIS: ICD-10-CM

## 2024-12-03 DIAGNOSIS — M15.0 PRIMARY OSTEOARTHRITIS INVOLVING MULTIPLE JOINTS: Primary | ICD-10-CM

## 2024-12-03 PROCEDURE — 99308 SBSQ NF CARE LOW MDM 20: CPT | Performed by: INTERNAL MEDICINE

## 2024-12-03 NOTE — LETTER
Patient: Jailyn Dai  : 1930    Encounter Date: 2024    Subjective  Patient ID: Jailyn Dai is a 89 y.o. female who is long term resident being seen and evaluated for multiple medical problems.    Patient is alert, pleasant, forgetful.  She denies shortness of breath, cough improved.   Her appetite is okay.  She had no recent falls. She ambulates short distances with walker.          Review of Systems   Constitutional:  Negative for fever and unexpected weight change.   HENT:  Negative for sore throat.    Respiratory:  Positive for cough. Negative for shortness of breath.    Cardiovascular:  Negative for chest pain and palpitations.   Gastrointestinal:  Negative for abdominal pain, constipation, diarrhea, nausea and vomiting.   Genitourinary:  Negative for difficulty urinating and frequency.   Musculoskeletal:  Positive for arthralgias. Negative for back pain.   Skin:  Negative for rash.   Neurological:  Negative for dizziness, seizures and headaches.   Psychiatric/Behavioral:  Positive for confusion. Negative for agitation. The patient is nervous/anxious.        Objective  LMP  (LMP Unknown)     Physical Exam  Vitals reviewed.   Constitutional:       General: She is not in acute distress.  HENT:      Mouth/Throat:      Mouth: Mucous membranes are moist.   Cardiovascular:      Rate and Rhythm: Normal rate and regular rhythm.      Heart sounds: Normal heart sounds.   Pulmonary:      Breath sounds: Normal breath sounds. No rales.   Abdominal:      General: Bowel sounds are normal.      Palpations: Abdomen is soft.      Tenderness: There is no abdominal tenderness.   Musculoskeletal:         General: No swelling or tenderness.      Cervical back: Neck supple. No tenderness.      Right lower leg: Edema present.      Left lower leg: Edema present.   Skin:     General: Skin is warm.   Neurological:      General: No focal deficit present.      Mental Status: She is alert.         Assessment/Plan  Problem List  Items Addressed This Visit       Chronic heart failure with preserved ejection fraction (HFpEF)    Gastroesophageal reflux disease without esophagitis    Osteoarthritis - Primary                          Goals    None     Will continue fall precautions, supportive care.        Electronically Signed By: Swetha Garcia MD   3/3/25 11:45 AM

## 2024-12-24 ENCOUNTER — NURSING HOME VISIT (OUTPATIENT)
Dept: POST ACUTE CARE | Facility: EXTERNAL LOCATION | Age: 89
End: 2024-12-24
Payer: MEDICARE

## 2024-12-24 DIAGNOSIS — R19.7 ACUTE DIARRHEA: Primary | ICD-10-CM

## 2024-12-24 DIAGNOSIS — I50.32 CHRONIC HEART FAILURE WITH PRESERVED EJECTION FRACTION (HFPEF): ICD-10-CM

## 2024-12-24 DIAGNOSIS — K21.9 GASTROESOPHAGEAL REFLUX DISEASE WITHOUT ESOPHAGITIS: ICD-10-CM

## 2024-12-24 PROCEDURE — 99309 SBSQ NF CARE MODERATE MDM 30: CPT | Performed by: INTERNAL MEDICINE

## 2025-01-07 ENCOUNTER — NURSING HOME VISIT (OUTPATIENT)
Dept: POST ACUTE CARE | Facility: EXTERNAL LOCATION | Age: OVER 89
End: 2025-01-07
Payer: MEDICARE

## 2025-01-07 DIAGNOSIS — I10 ESSENTIAL HYPERTENSION: Primary | ICD-10-CM

## 2025-01-07 DIAGNOSIS — M15.0 PRIMARY OSTEOARTHRITIS INVOLVING MULTIPLE JOINTS: ICD-10-CM

## 2025-01-07 DIAGNOSIS — I50.32 CHRONIC HEART FAILURE WITH PRESERVED EJECTION FRACTION (HFPEF): ICD-10-CM

## 2025-01-07 PROCEDURE — 99308 SBSQ NF CARE LOW MDM 20: CPT | Performed by: INTERNAL MEDICINE

## 2025-01-07 NOTE — LETTER
Patient: Jailyn Dai  : 1930    Encounter Date: 2025    Subjective  Patient ID: Jailyn Dai is a 89 y.o. female who is long term resident being seen and evaluated for multiple medical problems.    Patient is alert, pleasant, forgetful.  She is noted to have a productive cough, no fever noted.  She tested negative for COVID. Denies shortness of breath,    Her appetite is fair but she is drinking liquids.  She had no recent falls. She ambulates short distances with walker.          Review of Systems   Constitutional:  Negative for fever and unexpected weight change.   HENT:  Negative for sore throat.    Respiratory:  Positive for cough. Negative for shortness of breath.    Cardiovascular:  Negative for chest pain and palpitations.   Gastrointestinal:  Positive for diarrhea. Negative for abdominal pain, constipation, nausea and vomiting.   Genitourinary:  Negative for difficulty urinating and frequency.   Musculoskeletal:  Positive for arthralgias. Negative for back pain.   Skin:  Negative for rash.   Neurological:  Negative for dizziness, seizures and headaches.   Psychiatric/Behavioral:  Positive for confusion. Negative for agitation. The patient is nervous/anxious.        Objective  LMP  (LMP Unknown)     Physical Exam  Vitals reviewed.   Constitutional:       General: She is not in acute distress.  HENT:      Mouth/Throat:      Mouth: Mucous membranes are moist.   Cardiovascular:      Rate and Rhythm: Normal rate and regular rhythm.      Heart sounds: Normal heart sounds.   Pulmonary:      Breath sounds: Normal breath sounds. No rales.   Abdominal:      General: Bowel sounds are normal.      Palpations: Abdomen is soft.      Tenderness: There is no abdominal tenderness.   Musculoskeletal:         General: No swelling or tenderness.      Cervical back: Neck supple. No tenderness.      Right lower leg: Edema present.      Left lower leg: Edema present.   Skin:     General: Skin is warm.   Neurological:       General: No focal deficit present.      Mental Status: She is alert.         Assessment/Plan  Problem List Items Addressed This Visit       Chronic heart failure with preserved ejection fraction (HFpEF)    Essential hypertension - Primary    Osteoarthritis                              Goals    None     Will  encourage fluids, monitor temperature, continue supportive care.        Electronically Signed By: Swetha Garcia MD   3/18/25 10:08 PM

## 2025-01-16 ENCOUNTER — APPOINTMENT (OUTPATIENT)
Age: OVER 89
End: 2025-01-16
Payer: MEDICARE

## 2025-01-16 VITALS
SYSTOLIC BLOOD PRESSURE: 132 MMHG | WEIGHT: 180 LBS | TEMPERATURE: 98.6 F | OXYGEN SATURATION: 96 % | RESPIRATION RATE: 18 BRPM | BODY MASS INDEX: 33.99 KG/M2 | DIASTOLIC BLOOD PRESSURE: 66 MMHG | HEART RATE: 88 BPM | HEIGHT: 61 IN

## 2025-01-16 DIAGNOSIS — Z86.79 HISTORY OF ATRIAL FIBRILLATION: ICD-10-CM

## 2025-01-16 DIAGNOSIS — I50.32 CHRONIC HEART FAILURE WITH PRESERVED EJECTION FRACTION (HFPEF): ICD-10-CM

## 2025-01-16 DIAGNOSIS — I50.30 DIASTOLIC HEART FAILURE, UNSPECIFIED HF CHRONICITY: ICD-10-CM

## 2025-01-16 DIAGNOSIS — I10 ESSENTIAL HYPERTENSION: ICD-10-CM

## 2025-01-16 DIAGNOSIS — I47.9 PAROXYSMAL TACHYCARDIA: ICD-10-CM

## 2025-01-16 DIAGNOSIS — R07.89 CHEST DISCOMFORT: ICD-10-CM

## 2025-01-16 DIAGNOSIS — I25.10 ATHEROSCLEROSIS OF NATIVE CORONARY ARTERY OF NATIVE HEART WITHOUT ANGINA PECTORIS: Primary | ICD-10-CM

## 2025-01-16 PROCEDURE — 1159F MED LIST DOCD IN RCRD: CPT | Performed by: INTERNAL MEDICINE

## 2025-01-16 PROCEDURE — 1036F TOBACCO NON-USER: CPT | Performed by: INTERNAL MEDICINE

## 2025-01-16 PROCEDURE — 99214 OFFICE O/P EST MOD 30 MIN: CPT | Performed by: INTERNAL MEDICINE

## 2025-01-16 PROCEDURE — 1160F RVW MEDS BY RX/DR IN RCRD: CPT | Performed by: INTERNAL MEDICINE

## 2025-01-16 PROCEDURE — 3075F SYST BP GE 130 - 139MM HG: CPT | Performed by: INTERNAL MEDICINE

## 2025-01-16 PROCEDURE — 1123F ACP DISCUSS/DSCN MKR DOCD: CPT | Performed by: INTERNAL MEDICINE

## 2025-01-16 PROCEDURE — 1126F AMNT PAIN NOTED NONE PRSNT: CPT | Performed by: INTERNAL MEDICINE

## 2025-01-16 PROCEDURE — 3078F DIAST BP <80 MM HG: CPT | Performed by: INTERNAL MEDICINE

## 2025-01-16 PROCEDURE — 1157F ADVNC CARE PLAN IN RCRD: CPT | Performed by: INTERNAL MEDICINE

## 2025-01-16 ASSESSMENT — LIFESTYLE VARIABLES
HOW OFTEN DO YOU HAVE A DRINK CONTAINING ALCOHOL: NEVER
AUDIT TOTAL SCORE: 0
HAS A RELATIVE, FRIEND, DOCTOR, OR ANOTHER HEALTH PROFESSIONAL EXPRESSED CONCERN ABOUT YOUR DRINKING OR SUGGESTED YOU CUT DOWN: NO
HAVE YOU OR SOMEONE ELSE BEEN INJURED AS A RESULT OF YOUR DRINKING: NO
AUDIT-C TOTAL SCORE: 0
HOW MANY STANDARD DRINKS CONTAINING ALCOHOL DO YOU HAVE ON A TYPICAL DAY: PATIENT DOES NOT DRINK
HOW OFTEN DO YOU HAVE SIX OR MORE DRINKS ON ONE OCCASION: NEVER
SKIP TO QUESTIONS 9-10: 1

## 2025-01-16 ASSESSMENT — PAIN SCALES - GENERAL: PAINLEVEL_OUTOF10: 0-NO PAIN

## 2025-01-16 ASSESSMENT — ENCOUNTER SYMPTOMS
OCCASIONAL FEELINGS OF UNSTEADINESS: 0
LOSS OF SENSATION IN FEET: 0
DEPRESSION: 0

## 2025-01-16 NOTE — PROGRESS NOTES
History of present illness:  This is a very pleasant 89-year-old functional female patient he follow-up in my office on history of heart failure diastolic dysfunction. Patient is reasonably functional had a stress test last year showed no ischemia ejection fraction preserved on the 2 D echo 4/2024.  Patient had mechanical fall in October 2024.  She was treated at [facility].  Had head CT showing small subarachnoid hemorrhage medically.  She has been also complaining of chronic cough mildly productive associated with retrosternal chest pain.  The pain is reproducible to palpation and with cough.  Saw Dr. Lassiter and put her on inhalers without significant improvement of the cough.  She was treated with antibiotics also in the past.  His cough has been going on for several months.  No cardiac standpoint patient reports the chest pain retrosternal, worse with cough worse with touching her chest.  Mild shortness of breath with activity.  Denies any nausea vomiting fever chills.    Past Medical History:   Diagnosis Date    Angina pectoris (CMS-[facility]) 11/13/2023    Anxiety     Messina's esophagus     CAD (coronary artery disease)     CHF (congestive heart failure) (Multi)     Dementia (Multi)     Disability of walking 10/17/2023    Dizziness 11/13/2023    Essential hypertension     GERD (gastroesophageal reflux disease)     Lightheadedness 11/13/2023    Lumbar radiculopathy 10/17/2023    Mixed hyperlipidemia 10/17/2023    Shortness of breath 11/13/2023       Past Surgical History:   Procedure Laterality Date    TOTAL HIP ARTHROPLASTY Right        Allergies   Allergen Reactions    Meclizine Hcl Dizziness        reports that she has never smoked. She has never been exposed to tobacco smoke. She has never used smokeless tobacco. She reports that she does not drink alcohol and does not use drugs.    No family history on file.    Patient's Medications   New Prescriptions    No medications on file   Previous Medications     ACETAMINOPHEN (TYLENOL) 500 MG TABLET    Take by mouth.    ALBUTEROL 90 MCG/ACTUATION INHALER        ASPIRIN (CALLY LOW DOSE ASPIRIN) 81 MG EC TABLET    Take 1 tablet (81 mg) by mouth once daily.    CALCIUM CARBONATE-VITAMIN D3 ORAL    Take by mouth.    CHOLECALCIFEROL (VITAMIN D-3) 25 MCG (1000 UT) CAPSULE    Take 2 capsules (50 mcg) by mouth once daily.    CYANOCOBALAMIN (VITAMIN B-12) 1,000 MCG TABLET    Take 1 tablet (1,000 mcg) by mouth once daily.    DAPAGLIFLOZIN PROPANEDIOL (FARXIGA) 10 MG    Take 1 tablet (10 mg) by mouth once daily.    DICLOFENAC SODIUM (VOLTAREN) 1 % GEL        DOCUSATE SODIUM (COLACE) 100 MG CAPSULE    Take 1 capsule (100 mg) by mouth once daily.    DULOXETINE (CYMBALTA) 20 MG DR CAPSULE    Take 1 capsule (20 mg) by mouth once daily.    FLUTICASONE (FLONASE) 50 MCG/ACTUATION NASAL SPRAY        FLUTICASONE-UMECLIDIN-VILANTER (TRELEGY-ELLIPTA) 100-62.5-25 MCG BLISTER WITH DEVICE    Inhale 1 puff once daily.    FUROSEMIDE (LASIX) 40 MG TABLET    Take 2 tablets (80 mg) by mouth once every 24 hours.    ISOSORBIDE MONONITRATE ER (IMDUR) 30 MG 24 HR TABLET    Take 1 tablet (30 mg) by mouth once every 24 hours.    LEVOTHYROXINE (EUTHYROX) 50 MCG TABLET    Take 1 tablet (50 mcg) by mouth once every 24 hours.    LOSARTAN (COZAAR) 50 MG TABLET    Take 1 tablet (50 mg) by mouth once every 24 hours.    OMEPRAZOLE (PRILOSEC) 40 MG DR CAPSULE    Take 1 capsule (40 mg) by mouth once daily in the morning. Take before meals.    PRAVASTATIN (PRAVACHOL) 40 MG TABLET    Take 1 tablet (40 mg) by mouth once every 24 hours.    SPIRONOLACTONE (ALDACTONE) 25 MG TABLET    Take 1 tablet (25 mg) by mouth once every 24 hours.    TRAMADOL (ULTRAM) 50 MG TABLET    Take 1 tablet (50 mg) by mouth every 8 hours if needed for severe pain (7 - 10).    VIT A/VIT C/VIT E/ZINC/COPPER (ICAPS AREDS ORAL)    Take 1 capsule by mouth once daily.    VITAMIN E MIXED 400 UNIT TABLET    Take 1 tablet by mouth once daily.   Modified  Medications    No medications on file   Discontinued Medications    No medications on file       Objective   Physical Exam  General: Patient in no acute distress   HEENT: Atraumatic normocephalic.  Neck: Supple, jugular venous pressure within normal limit.  No bruits  Lungs: Clear to auscultation bilaterally  Cardiovascular: Regular rate and rhythm, normal heart sounds, no murmurs rubs or gallops  Abdomen: Soft nontender nondistended.  Normal bowel sounds.  Extremities: Warm to touch, no edema.    Lab Review   No visits with results within 2 Month(s) from this visit.   Latest known visit with results is:   Admission on 04/01/2024, Discharged on 04/01/2024   Component Date Value    WBC 04/01/2024 8.0     nRBC 04/01/2024 0.0     RBC 04/01/2024 3.64 (L)     Hemoglobin 04/01/2024 11.9 (L)     Hematocrit 04/01/2024 36.1     MCV 04/01/2024 99     MCH 04/01/2024 32.7     MCHC 04/01/2024 33.0     RDW 04/01/2024 12.9     Platelets 04/01/2024 229     Neutrophils % 04/01/2024 70.3     Immature Granulocytes %,* 04/01/2024 0.4     Lymphocytes % 04/01/2024 13.8     Monocytes % 04/01/2024 11.8     Eosinophils % 04/01/2024 3.1     Basophils % 04/01/2024 0.6     Neutrophils Absolute 04/01/2024 5.60 (H)     Immature Granulocytes Ab* 04/01/2024 0.03     Lymphocytes Absolute 04/01/2024 1.10     Monocytes Absolute 04/01/2024 0.94 (H)     Eosinophils Absolute 04/01/2024 0.25     Basophils Absolute 04/01/2024 0.05     Glucose 04/01/2024 106 (H)     Sodium 04/01/2024 134     Potassium 04/01/2024 4.9     Chloride 04/01/2024 96 (L)     Bicarbonate 04/01/2024 26     Urea Nitrogen 04/01/2024 33 (H)     Creatinine 04/01/2024 1.50     eGFR 04/01/2024 32 (L)     Calcium 04/01/2024 9.0     Anion Gap 04/01/2024 12     PROBNP 04/01/2024 459     Ventricular Rate 04/01/2024 77     Atrial Rate 04/01/2024 77     NJ Interval 04/01/2024 162     QRS Duration 04/01/2024 80     QT Interval 04/01/2024 398     QTC Calculation(Bazett) 04/01/2024 450     P Axis  04/01/2024 55     R Axis 04/01/2024 1     T Axis 04/01/2024 51     QRS Count 04/01/2024 12     Q Onset 04/01/2024 230     P Onset 04/01/2024 149     P Offset 04/01/2024 208     T Offset 04/01/2024 429     QTC Fredericia 04/01/2024 432     Troponin T, High Sensiti* 04/01/2024 40 (HH)     Troponin T, High Sensiti* 04/01/2024 38 (HH)         Assessment/Plan   Patient Active Problem List   Diagnosis    Anxiety    Atherosclerotic heart disease of native coronary artery without angina pectoris    Avascular necrosis of bone of left hip (Multi)    Messina's esophagus    Chronic heart failure with preserved ejection fraction (HFpEF)    Dementia without behavioral disturbance (Multi)    Essential hypertension    Gastroesophageal reflux disease without esophagitis    History of atrial fibrillation    Hypothyroidism (acquired)    Osteoarthritis    Benign neoplasm of large bowel    Dizziness    Contusion of hand    Dislocation of shoulder joint    Fall    Frequent falls    Hyponatremia    Internal hemorrhoids    Lower leg edema    Nausea & vomiting    Obesity    Primary osteoarthritis of both hips    Physical debility    Vitamin B12 deficiency    History of colonic polyps    Hip pain    Edema of both lower extremities    Chest discomfort    Cardiac arrhythmia      This is a very pleasant 89-year-old functional female patient he follow-up in my office on history of heart failure diastolic dysfunction. Patient is reasonably functional had a stress test last year showed no ischemia ejection fraction preserved on the 2 D echo 4/2024.  Patient had mechanical fall in October 2024.  She was treated at [facility].  Had head CT showing small subarachnoid hemorrhage medically.  She has been also complaining of chronic cough mildly productive associated with retrosternal chest pain.  The pain is reproducible to palpation and with cough.  Saw Dr. Lassiter and put her on inhalers without significant improvement of the cough.  She was  treated with antibiotics also in the past.  His cough has been going on for several months.  No cardiac standpoint patient reports the chest pain retrosternal, worse with cough worse with touching her chest.  Mild shortness of breath with activity.  Denies any nausea vomiting fever chills.    Reviewed patient records from [facility] and hospitalization.  This appears to be mechanical fall patient reports no dizziness or syncope prior to falling.  Her blood pressure and heart rate well-controlled.  He is not on medical therapy from cardiac standpoint.  I will continue same treatment.  Does not appear volume overloaded to me.  At this point she stable from my standpoint I will see her in 6 months.  Consider treatment with inhalers and antibiotics by primary care doctor done, if no improvement of her cough.      Cassandra Camarillo MD

## 2025-01-24 ENCOUNTER — NURSING HOME VISIT (OUTPATIENT)
Dept: POST ACUTE CARE | Facility: EXTERNAL LOCATION | Age: OVER 89
End: 2025-01-24
Payer: MEDICARE

## 2025-01-24 DIAGNOSIS — M15.0 PRIMARY OSTEOARTHRITIS INVOLVING MULTIPLE JOINTS: ICD-10-CM

## 2025-01-24 DIAGNOSIS — J41.0 SIMPLE CHRONIC BRONCHITIS (MULTI): Primary | ICD-10-CM

## 2025-01-24 DIAGNOSIS — I50.32 CHRONIC HEART FAILURE WITH PRESERVED EJECTION FRACTION (HFPEF): ICD-10-CM

## 2025-01-24 PROCEDURE — 99309 SBSQ NF CARE MODERATE MDM 30: CPT | Performed by: INTERNAL MEDICINE

## 2025-01-24 NOTE — LETTER
Patient: Jailyn Dai  : 1930    Encounter Date: 2025    Subjective  Patient ID: Jailyn Dai is a 89 y.o. female who is long term resident being seen and evaluated for multiple medical problems.    Patient is alert, pleasant, forgetful.  She is noted to have a productive cough and hoarseness, denies shortness of breath,    Her appetite is fair but she is drinking liquids.  She had no recent falls. She ambulates short distances with walker.          Review of Systems   Constitutional:  Negative for fever and unexpected weight change.   HENT:  Negative for sore throat.    Respiratory:  Positive for cough. Negative for shortness of breath.    Cardiovascular:  Negative for chest pain and palpitations.   Gastrointestinal:  Positive for diarrhea. Negative for abdominal pain, constipation, nausea and vomiting.   Genitourinary:  Negative for difficulty urinating and frequency.   Musculoskeletal:  Positive for arthralgias. Negative for back pain.   Skin:  Negative for rash.   Neurological:  Negative for dizziness, seizures and headaches.   Psychiatric/Behavioral:  Positive for confusion. Negative for agitation. The patient is nervous/anxious.        Objective  LMP  (LMP Unknown)     Physical Exam  Vitals reviewed.   Constitutional:       General: She is not in acute distress.  HENT:      Mouth/Throat:      Mouth: Mucous membranes are moist.   Cardiovascular:      Rate and Rhythm: Normal rate and regular rhythm.      Heart sounds: Normal heart sounds.   Pulmonary:      Breath sounds: Normal breath sounds. No rales.   Abdominal:      General: Bowel sounds are normal.      Palpations: Abdomen is soft.      Tenderness: There is no abdominal tenderness.   Musculoskeletal:         General: No swelling or tenderness.      Cervical back: Neck supple. No tenderness.      Right lower leg: Edema present.      Left lower leg: Edema present.   Skin:     General: Skin is warm.   Neurological:      General: No focal deficit  present.      Mental Status: She is alert.         Assessment/Plan  Problem List Items Addressed This Visit       Chronic heart failure with preserved ejection fraction (HFpEF)    Osteoarthritis     Other Visit Diagnoses       Simple chronic bronchitis (Multi)    -  Primary                                     Goals    None     Will  continue Mucinex, start Zpack, monitor temperature, continue supportive care.        Electronically Signed By: Swetha Garcia MD   3/23/25  6:19 PM

## 2025-02-04 ENCOUNTER — NURSING HOME VISIT (OUTPATIENT)
Dept: POST ACUTE CARE | Facility: EXTERNAL LOCATION | Age: OVER 89
End: 2025-02-04
Payer: MEDICARE

## 2025-02-04 DIAGNOSIS — M15.0 PRIMARY OSTEOARTHRITIS INVOLVING MULTIPLE JOINTS: Primary | ICD-10-CM

## 2025-02-04 DIAGNOSIS — I10 ESSENTIAL HYPERTENSION: ICD-10-CM

## 2025-02-04 DIAGNOSIS — I50.32 CHRONIC HEART FAILURE WITH PRESERVED EJECTION FRACTION (HFPEF): ICD-10-CM

## 2025-02-04 NOTE — LETTER
Patient: Jailyn Dai  : 1930    Encounter Date: 2025    Subjective  Patient ID: Jailyn Dai is a 89 y.o. female who is long term resident being seen and evaluated for multiple medical problems.    Patient is alert, pleasant, forgetful.  She  denies shortness of breath,    Her appetite is ok. .  She had no recent falls. She ambulates short distances with walker had no recent falls.  She participates in group activities.  She is compliant with medications.          Review of Systems   Constitutional:  Negative for fever and unexpected weight change.   HENT:  Negative for sore throat.    Respiratory:  Negative for shortness of breath.    Cardiovascular:  Negative for chest pain and palpitations.   Gastrointestinal:  Negative for abdominal pain, constipation, nausea and vomiting.   Genitourinary:  Negative for difficulty urinating and frequency.   Musculoskeletal:  Positive for arthralgias. Negative for back pain.   Skin:  Negative for rash.   Neurological:  Negative for dizziness, seizures and headaches.   Psychiatric/Behavioral:  Positive for confusion. Negative for agitation. The patient is nervous/anxious.        Objective  LMP  (LMP Unknown)     Physical Exam  Vitals reviewed.   Constitutional:       General: She is not in acute distress.  HENT:      Mouth/Throat:      Mouth: Mucous membranes are moist.   Cardiovascular:      Rate and Rhythm: Normal rate and regular rhythm.      Heart sounds: Normal heart sounds.   Pulmonary:      Breath sounds: Normal breath sounds. No rales.   Abdominal:      General: Bowel sounds are normal.      Palpations: Abdomen is soft.      Tenderness: There is no abdominal tenderness.   Musculoskeletal:         General: No swelling or tenderness.      Cervical back: Neck supple. No tenderness.      Right lower leg: Edema present.      Left lower leg: Edema present.   Skin:     General: Skin is warm.   Neurological:      General: No focal deficit present.      Mental Status:  She is alert.         Assessment/Plan  Problem List Items Addressed This Visit       Chronic heart failure with preserved ejection fraction (HFpEF)    Essential hypertension    Osteoarthritis - Primary                                  Goals    None     Will  continue fall precautions, psychiatric follow up, continue supportive care.        Electronically Signed By: Swetha Garcia MD   3/29/25  7:01 PM

## 2025-03-03 ASSESSMENT — ENCOUNTER SYMPTOMS
FREQUENCY: 0
HEADACHES: 0
VOMITING: 0
FEVER: 0
SORE THROAT: 0
CONFUSION: 1
DIFFICULTY URINATING: 0
DIARRHEA: 0
HEADACHES: 0
DIZZINESS: 0
SHORTNESS OF BREATH: 0
FREQUENCY: 0
SEIZURES: 0
AGITATION: 0
NERVOUS/ANXIOUS: 1
CONSTIPATION: 0
BACK PAIN: 0
ABDOMINAL PAIN: 0
VOMITING: 0
UNEXPECTED WEIGHT CHANGE: 0
CONFUSION: 1
UNEXPECTED WEIGHT CHANGE: 0
DIFFICULTY URINATING: 0
ABDOMINAL PAIN: 0
SEIZURES: 0
SHORTNESS OF BREATH: 0
SORE THROAT: 0
PALPITATIONS: 0
COUGH: 1
DIZZINESS: 0
NAUSEA: 0
AGITATION: 0
BACK PAIN: 0
NERVOUS/ANXIOUS: 1
DIARRHEA: 0
CONSTIPATION: 0
ARTHRALGIAS: 1
PALPITATIONS: 0
COUGH: 1
ARTHRALGIAS: 1
NAUSEA: 0
FEVER: 0

## 2025-03-03 NOTE — PROGRESS NOTES
Subjective   Patient ID: Jailyn Dai is a 89 y.o. female who is long term resident being seen and evaluated for multiple medical problems.    Patient is alert, pleasant, forgetful.  She denies shortness of breath, cough improved.   Her appetite is okay.  She had no recent falls. She ambulates short distances with walker.          Review of Systems   Constitutional:  Negative for fever and unexpected weight change.   HENT:  Negative for sore throat.    Respiratory:  Positive for cough. Negative for shortness of breath.    Cardiovascular:  Negative for chest pain and palpitations.   Gastrointestinal:  Negative for abdominal pain, constipation, diarrhea, nausea and vomiting.   Genitourinary:  Negative for difficulty urinating and frequency.   Musculoskeletal:  Positive for arthralgias. Negative for back pain.   Skin:  Negative for rash.   Neurological:  Negative for dizziness, seizures and headaches.   Psychiatric/Behavioral:  Positive for confusion. Negative for agitation. The patient is nervous/anxious.        Objective   LMP  (LMP Unknown)     Physical Exam  Vitals reviewed.   Constitutional:       General: She is not in acute distress.  HENT:      Mouth/Throat:      Mouth: Mucous membranes are moist.   Cardiovascular:      Rate and Rhythm: Normal rate and regular rhythm.      Heart sounds: Normal heart sounds.   Pulmonary:      Breath sounds: Normal breath sounds. No rales.   Abdominal:      General: Bowel sounds are normal.      Palpations: Abdomen is soft.      Tenderness: There is no abdominal tenderness.   Musculoskeletal:         General: No swelling or tenderness.      Cervical back: Neck supple. No tenderness.      Right lower leg: Edema present.      Left lower leg: Edema present.   Skin:     General: Skin is warm.   Neurological:      General: No focal deficit present.      Mental Status: She is alert.         Assessment/Plan   Problem List Items Addressed This Visit       Chronic heart failure with  preserved ejection fraction (HFpEF)    Gastroesophageal reflux disease without esophagitis    Osteoarthritis - Primary                          Goals    None     Will continue fall precautions, supportive care.

## 2025-03-03 NOTE — PROGRESS NOTES
Subjective   Patient ID: Jailyn Dai is a 89 y.o. female who is long term resident being seen and evaluated for multiple medical problems.    Patient is alert, pleasant, forgetful.  She is complaining of productive cough, denies shortness of breath.   Her appetite is okay.  She had no recent falls. She ambulates short distances with walker.          Review of Systems   Constitutional:  Negative for fever and unexpected weight change.   HENT:  Negative for sore throat.    Respiratory:  Positive for cough. Negative for shortness of breath.    Cardiovascular:  Negative for chest pain and palpitations.   Gastrointestinal:  Negative for abdominal pain, constipation, diarrhea, nausea and vomiting.   Genitourinary:  Negative for difficulty urinating and frequency.   Musculoskeletal:  Positive for arthralgias. Negative for back pain.   Skin:  Negative for rash.   Neurological:  Negative for dizziness, seizures and headaches.   Psychiatric/Behavioral:  Positive for confusion. Negative for agitation. The patient is nervous/anxious.        Objective   LMP  (LMP Unknown)     Physical Exam  Vitals reviewed.   Constitutional:       General: She is not in acute distress.  HENT:      Mouth/Throat:      Mouth: Mucous membranes are moist.   Cardiovascular:      Rate and Rhythm: Normal rate and regular rhythm.      Heart sounds: Normal heart sounds.   Pulmonary:      Breath sounds: Normal breath sounds. No rales.   Abdominal:      General: Bowel sounds are normal.      Palpations: Abdomen is soft.      Tenderness: There is no abdominal tenderness.   Musculoskeletal:         General: No swelling or tenderness.      Cervical back: Neck supple. No tenderness.      Right lower leg: Edema present.      Left lower leg: Edema present.   Skin:     General: Skin is warm.   Neurological:      General: No focal deficit present.      Mental Status: She is alert.         Assessment/Plan   Problem List Items Addressed This Visit       Chronic heart  failure with preserved ejection fraction (HFpEF)    Osteoarthritis     Other Visit Diagnoses       Acute bronchitis due to other specified organisms    -  Primary                             Goals    None     Will check COVID test, start Mucinex, monitor pulse ox, continue fall precautions, supportive care.

## 2025-03-11 ENCOUNTER — NURSING HOME VISIT (OUTPATIENT)
Dept: POST ACUTE CARE | Facility: EXTERNAL LOCATION | Age: OVER 89
End: 2025-03-11
Payer: MEDICARE

## 2025-03-11 DIAGNOSIS — M15.0 PRIMARY OSTEOARTHRITIS INVOLVING MULTIPLE JOINTS: ICD-10-CM

## 2025-03-11 DIAGNOSIS — F03.90 DEMENTIA WITHOUT BEHAVIORAL DISTURBANCE (MULTI): Primary | ICD-10-CM

## 2025-03-11 DIAGNOSIS — I10 ESSENTIAL HYPERTENSION: ICD-10-CM

## 2025-03-11 PROCEDURE — 99308 SBSQ NF CARE LOW MDM 20: CPT | Performed by: INTERNAL MEDICINE

## 2025-03-11 NOTE — LETTER
Patient: Jailyn Dai  : 1930    Encounter Date: 2025    Subjective  Patient ID: Jailyn Dai is a 89 y.o. female who is long term resident being seen and evaluated for multiple medical problems.    Patient is alert, pleasant, forgetful.  She has a productive cough, denies shortness of breath.   Her appetite is ok. .  She had no recent falls. She ambulates short distances with walker had no recent falls.          Review of Systems   Constitutional:  Negative for fever and unexpected weight change.   HENT:  Negative for sore throat.    Respiratory:  Negative for shortness of breath.    Cardiovascular:  Negative for chest pain and palpitations.   Gastrointestinal:  Negative for abdominal pain, constipation, nausea and vomiting.   Genitourinary:  Negative for difficulty urinating and frequency.   Musculoskeletal:  Positive for arthralgias. Negative for back pain.   Skin:  Negative for rash.   Neurological:  Negative for dizziness, seizures and headaches.   Psychiatric/Behavioral:  Negative for agitation and confusion. The patient is nervous/anxious.        Objective  LMP  (LMP Unknown)     Physical Exam  Vitals reviewed.   Constitutional:       General: She is not in acute distress.  HENT:      Mouth/Throat:      Mouth: Mucous membranes are moist.   Cardiovascular:      Rate and Rhythm: Normal rate and regular rhythm.      Heart sounds: Normal heart sounds.   Pulmonary:      Breath sounds: Normal breath sounds. No rales.   Abdominal:      General: Bowel sounds are normal.      Palpations: Abdomen is soft.      Tenderness: There is no abdominal tenderness.   Musculoskeletal:         General: No swelling or tenderness.      Cervical back: Neck supple. No tenderness.      Right lower leg: Edema present.      Left lower leg: Edema present.   Skin:     General: Skin is warm.   Neurological:      General: No focal deficit present.      Mental Status: She is alert.         Assessment/Plan  Problem List Items  Addressed This Visit       Dementia without behavioral disturbance (Multi) - Primary    Essential hypertension    Osteoarthritis                                    Goals    None     Will  continue fall precautions, psychiatric follow up, continue supportive care.        Electronically Signed By: Swetha Garcia MD   4/7/25 11:52 AM

## 2025-03-18 ASSESSMENT — ENCOUNTER SYMPTOMS
SHORTNESS OF BREATH: 0
NAUSEA: 0
BACK PAIN: 0
UNEXPECTED WEIGHT CHANGE: 0
FREQUENCY: 0
HEADACHES: 0
ABDOMINAL PAIN: 0
AGITATION: 0
VOMITING: 0
DIARRHEA: 1
ARTHRALGIAS: 1
DIZZINESS: 0
DIZZINESS: 0
COUGH: 1
FEVER: 0
NAUSEA: 0
DIARRHEA: 1
HEADACHES: 0
NERVOUS/ANXIOUS: 1
VOMITING: 0
CONSTIPATION: 0
COUGH: 1
SORE THROAT: 0
PALPITATIONS: 0
ARTHRALGIAS: 1
PALPITATIONS: 0
SEIZURES: 0
NERVOUS/ANXIOUS: 1
DIFFICULTY URINATING: 0
FREQUENCY: 0
SHORTNESS OF BREATH: 0
CONFUSION: 1
CONFUSION: 1
CONSTIPATION: 0
BACK PAIN: 0
AGITATION: 0
ABDOMINAL PAIN: 0
SORE THROAT: 0
UNEXPECTED WEIGHT CHANGE: 0
DIFFICULTY URINATING: 0
SEIZURES: 0
FEVER: 0

## 2025-03-18 NOTE — PROGRESS NOTES
Subjective   Patient ID: Jailyn Dai is a 89 y.o. female who is long term resident being seen and evaluated for multiple medical problems.    Patient is alert, pleasant, forgetful.  She is noted to have diarrhea for the last few days, along with multiple other residents in the unit.  Denies abdominal pain, no vomiting or fever noted.  She denies shortness of breath, cough improved.   Her appetite is fair but she is drinking liquids.  She had no recent falls. She ambulates short distances with walker.          Review of Systems   Constitutional:  Negative for fever and unexpected weight change.   HENT:  Negative for sore throat.    Respiratory:  Positive for cough. Negative for shortness of breath.    Cardiovascular:  Negative for chest pain and palpitations.   Gastrointestinal:  Positive for diarrhea. Negative for abdominal pain, constipation, nausea and vomiting.   Genitourinary:  Negative for difficulty urinating and frequency.   Musculoskeletal:  Positive for arthralgias. Negative for back pain.   Skin:  Negative for rash.   Neurological:  Negative for dizziness, seizures and headaches.   Psychiatric/Behavioral:  Positive for confusion. Negative for agitation. The patient is nervous/anxious.        Objective   LMP  (LMP Unknown)     Physical Exam  Vitals reviewed.   Constitutional:       General: She is not in acute distress.  HENT:      Mouth/Throat:      Mouth: Mucous membranes are moist.   Cardiovascular:      Rate and Rhythm: Normal rate and regular rhythm.      Heart sounds: Normal heart sounds.   Pulmonary:      Breath sounds: Normal breath sounds. No rales.   Abdominal:      General: Bowel sounds are normal.      Palpations: Abdomen is soft.      Tenderness: There is no abdominal tenderness.   Musculoskeletal:         General: No swelling or tenderness.      Cervical back: Neck supple. No tenderness.      Right lower leg: Edema present.      Left lower leg: Edema present.   Skin:     General: Skin is warm.    Neurological:      General: No focal deficit present.      Mental Status: She is alert.         Assessment/Plan   Problem List Items Addressed This Visit       Chronic heart failure with preserved ejection fraction (HFpEF)    Gastroesophageal reflux disease without esophagitis     Other Visit Diagnoses       Acute diarrhea    -  Primary                                 Goals    None     Will obtain stool for culture, encourage fluids, monitor temperature, continue supportive care.

## 2025-03-19 NOTE — PROGRESS NOTES
Subjective   Patient ID: Jailyn Dai is a 89 y.o. female who is long term resident being seen and evaluated for multiple medical problems.    Patient is alert, pleasant, forgetful.  She is noted to have a productive cough, no fever noted.  She tested negative for COVID. Denies shortness of breath,    Her appetite is fair but she is drinking liquids.  She had no recent falls. She ambulates short distances with walker.          Review of Systems   Constitutional:  Negative for fever and unexpected weight change.   HENT:  Negative for sore throat.    Respiratory:  Positive for cough. Negative for shortness of breath.    Cardiovascular:  Negative for chest pain and palpitations.   Gastrointestinal:  Positive for diarrhea. Negative for abdominal pain, constipation, nausea and vomiting.   Genitourinary:  Negative for difficulty urinating and frequency.   Musculoskeletal:  Positive for arthralgias. Negative for back pain.   Skin:  Negative for rash.   Neurological:  Negative for dizziness, seizures and headaches.   Psychiatric/Behavioral:  Positive for confusion. Negative for agitation. The patient is nervous/anxious.        Objective   LMP  (LMP Unknown)     Physical Exam  Vitals reviewed.   Constitutional:       General: She is not in acute distress.  HENT:      Mouth/Throat:      Mouth: Mucous membranes are moist.   Cardiovascular:      Rate and Rhythm: Normal rate and regular rhythm.      Heart sounds: Normal heart sounds.   Pulmonary:      Breath sounds: Normal breath sounds. No rales.   Abdominal:      General: Bowel sounds are normal.      Palpations: Abdomen is soft.      Tenderness: There is no abdominal tenderness.   Musculoskeletal:         General: No swelling or tenderness.      Cervical back: Neck supple. No tenderness.      Right lower leg: Edema present.      Left lower leg: Edema present.   Skin:     General: Skin is warm.   Neurological:      General: No focal deficit present.      Mental Status: She is  alert.         Assessment/Plan   Problem List Items Addressed This Visit       Chronic heart failure with preserved ejection fraction (HFpEF)    Essential hypertension - Primary    Osteoarthritis                              Goals    None     Will  encourage fluids, monitor temperature, continue supportive care.

## 2025-03-23 ASSESSMENT — ENCOUNTER SYMPTOMS
SHORTNESS OF BREATH: 0
UNEXPECTED WEIGHT CHANGE: 0
PALPITATIONS: 0
FEVER: 0
FREQUENCY: 0
SORE THROAT: 0
CONFUSION: 1
AGITATION: 0
CONSTIPATION: 0
ABDOMINAL PAIN: 0
DIARRHEA: 1
SEIZURES: 0
NAUSEA: 0
ARTHRALGIAS: 1
DIZZINESS: 0
BACK PAIN: 0
NERVOUS/ANXIOUS: 1
VOMITING: 0
COUGH: 1
DIFFICULTY URINATING: 0
HEADACHES: 0

## 2025-03-23 NOTE — PROGRESS NOTES
Subjective   Patient ID: Jailyn Dai is a 89 y.o. female who is long term resident being seen and evaluated for multiple medical problems.    Patient is alert, pleasant, forgetful.  She is noted to have a productive cough and hoarseness, denies shortness of breath,    Her appetite is fair but she is drinking liquids.  She had no recent falls. She ambulates short distances with walker.          Review of Systems   Constitutional:  Negative for fever and unexpected weight change.   HENT:  Negative for sore throat.    Respiratory:  Positive for cough. Negative for shortness of breath.    Cardiovascular:  Negative for chest pain and palpitations.   Gastrointestinal:  Positive for diarrhea. Negative for abdominal pain, constipation, nausea and vomiting.   Genitourinary:  Negative for difficulty urinating and frequency.   Musculoskeletal:  Positive for arthralgias. Negative for back pain.   Skin:  Negative for rash.   Neurological:  Negative for dizziness, seizures and headaches.   Psychiatric/Behavioral:  Positive for confusion. Negative for agitation. The patient is nervous/anxious.        Objective   LMP  (LMP Unknown)     Physical Exam  Vitals reviewed.   Constitutional:       General: She is not in acute distress.  HENT:      Mouth/Throat:      Mouth: Mucous membranes are moist.   Cardiovascular:      Rate and Rhythm: Normal rate and regular rhythm.      Heart sounds: Normal heart sounds.   Pulmonary:      Breath sounds: Normal breath sounds. No rales.   Abdominal:      General: Bowel sounds are normal.      Palpations: Abdomen is soft.      Tenderness: There is no abdominal tenderness.   Musculoskeletal:         General: No swelling or tenderness.      Cervical back: Neck supple. No tenderness.      Right lower leg: Edema present.      Left lower leg: Edema present.   Skin:     General: Skin is warm.   Neurological:      General: No focal deficit present.      Mental Status: She is alert.         Assessment/Plan    Problem List Items Addressed This Visit       Chronic heart failure with preserved ejection fraction (HFpEF)    Osteoarthritis     Other Visit Diagnoses       Simple chronic bronchitis (Multi)    -  Primary                                     Goals    None     Will  continue Mucinex, start Zpack, monitor temperature, continue supportive care.

## 2025-03-29 ASSESSMENT — ENCOUNTER SYMPTOMS
SORE THROAT: 0
FEVER: 0
BACK PAIN: 0
VOMITING: 0
NAUSEA: 0
SHORTNESS OF BREATH: 0
CONSTIPATION: 0
ARTHRALGIAS: 1
ABDOMINAL PAIN: 0
SEIZURES: 0
FREQUENCY: 0
CONFUSION: 1
HEADACHES: 0
NERVOUS/ANXIOUS: 1
UNEXPECTED WEIGHT CHANGE: 0
AGITATION: 0
DIFFICULTY URINATING: 0
PALPITATIONS: 0
DIZZINESS: 0

## 2025-03-29 NOTE — PROGRESS NOTES
Subjective   Patient ID: Jailyn Dai is a 89 y.o. female who is long term resident being seen and evaluated for multiple medical problems.    Patient is alert, pleasant, forgetful.  She  denies shortness of breath,    Her appetite is ok. .  She had no recent falls. She ambulates short distances with walker had no recent falls.  She participates in group activities.  She is compliant with medications.          Review of Systems   Constitutional:  Negative for fever and unexpected weight change.   HENT:  Negative for sore throat.    Respiratory:  Negative for shortness of breath.    Cardiovascular:  Negative for chest pain and palpitations.   Gastrointestinal:  Negative for abdominal pain, constipation, nausea and vomiting.   Genitourinary:  Negative for difficulty urinating and frequency.   Musculoskeletal:  Positive for arthralgias. Negative for back pain.   Skin:  Negative for rash.   Neurological:  Negative for dizziness, seizures and headaches.   Psychiatric/Behavioral:  Positive for confusion. Negative for agitation. The patient is nervous/anxious.        Objective   LMP  (LMP Unknown)     Physical Exam  Vitals reviewed.   Constitutional:       General: She is not in acute distress.  HENT:      Mouth/Throat:      Mouth: Mucous membranes are moist.   Cardiovascular:      Rate and Rhythm: Normal rate and regular rhythm.      Heart sounds: Normal heart sounds.   Pulmonary:      Breath sounds: Normal breath sounds. No rales.   Abdominal:      General: Bowel sounds are normal.      Palpations: Abdomen is soft.      Tenderness: There is no abdominal tenderness.   Musculoskeletal:         General: No swelling or tenderness.      Cervical back: Neck supple. No tenderness.      Right lower leg: Edema present.      Left lower leg: Edema present.   Skin:     General: Skin is warm.   Neurological:      General: No focal deficit present.      Mental Status: She is alert.         Assessment/Plan   Problem List Items Addressed  This Visit       Chronic heart failure with preserved ejection fraction (HFpEF)    Essential hypertension    Osteoarthritis - Primary                                  Goals    None     Will  continue fall precautions, psychiatric follow up, continue supportive care.

## 2025-03-31 ENCOUNTER — APPOINTMENT (OUTPATIENT)
Dept: RADIOLOGY | Facility: HOSPITAL | Age: OVER 89
End: 2025-03-31
Payer: MEDICARE

## 2025-03-31 ENCOUNTER — HOSPITAL ENCOUNTER (EMERGENCY)
Facility: HOSPITAL | Age: OVER 89
Discharge: HOME | End: 2025-03-31
Payer: MEDICARE

## 2025-03-31 ENCOUNTER — APPOINTMENT (OUTPATIENT)
Dept: CARDIOLOGY | Facility: HOSPITAL | Age: OVER 89
End: 2025-03-31
Payer: MEDICARE

## 2025-03-31 VITALS
WEIGHT: 193.34 LBS | SYSTOLIC BLOOD PRESSURE: 145 MMHG | BODY MASS INDEX: 35.58 KG/M2 | HEIGHT: 62 IN | RESPIRATION RATE: 16 BRPM | DIASTOLIC BLOOD PRESSURE: 73 MMHG | TEMPERATURE: 96.6 F | HEART RATE: 91 BPM | OXYGEN SATURATION: 91 %
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
## 2025-03-31 DIAGNOSIS — S80.212A ABRASION OF LEFT KNEE, INITIAL ENCOUNTER: ICD-10-CM

## 2025-03-31 DIAGNOSIS — S09.90XA CLOSED HEAD INJURY, INITIAL ENCOUNTER: ICD-10-CM

## 2025-03-31 DIAGNOSIS — S00.01XA ABRASION OF SCALP, INITIAL ENCOUNTER: ICD-10-CM

## 2025-03-31 LAB
APPEARANCE UR: CLEAR
BASOPHILS # BLD AUTO: 0.04 X10*3/UL (ref 0–0.1)
BASOPHILS NFR BLD AUTO: 0.4 %
BILIRUB UR STRIP.AUTO-MCNC: NEGATIVE MG/DL
BNP SERPL-MCNC: 44 PG/ML (ref 0–99)
CARDIAC TROPONIN I PNL SERPL HS: 11 NG/L (ref 0–13)
CARDIAC TROPONIN I PNL SERPL HS: 12 NG/L (ref 0–13)
COLOR UR: ABNORMAL
EOSINOPHIL # BLD AUTO: 0.17 X10*3/UL (ref 0–0.4)
EOSINOPHIL NFR BLD AUTO: 1.6 %
ERYTHROCYTE [DISTWIDTH] IN BLOOD BY AUTOMATED COUNT: 14.1 % (ref 11.5–14.5)
FLUAV RNA RESP QL NAA+PROBE: NOT DETECTED
FLUBV RNA RESP QL NAA+PROBE: NOT DETECTED
GLUCOSE UR STRIP.AUTO-MCNC: ABNORMAL MG/DL
HCT VFR BLD AUTO: 43.1 % (ref 36–46)
HGB BLD-MCNC: 14.4 G/DL (ref 12–16)
IMM GRANULOCYTES # BLD AUTO: 0.05 X10*3/UL (ref 0–0.5)
IMM GRANULOCYTES NFR BLD AUTO: 0.5 % (ref 0–0.9)
KETONES UR STRIP.AUTO-MCNC: NEGATIVE MG/DL
LEUKOCYTE ESTERASE UR QL STRIP.AUTO: NEGATIVE
LYMPHOCYTES # BLD AUTO: 2.51 X10*3/UL (ref 0.8–3)
LYMPHOCYTES NFR BLD AUTO: 23.8 %
MAGNESIUM SERPL-MCNC: 2.51 MG/DL (ref 1.6–2.4)
MCH RBC QN AUTO: 32.8 PG (ref 26–34)
MCHC RBC AUTO-ENTMCNC: 33.4 G/DL (ref 32–36)
MCV RBC AUTO: 98 FL (ref 80–100)
MONOCYTES # BLD AUTO: 1.13 X10*3/UL (ref 0.05–0.8)
MONOCYTES NFR BLD AUTO: 10.7 %
NEUTROPHILS # BLD AUTO: 6.65 X10*3/UL (ref 1.6–5.5)
NEUTROPHILS NFR BLD AUTO: 63 %
NITRITE UR QL STRIP.AUTO: NEGATIVE
NRBC BLD-RTO: 0 /100 WBCS (ref 0–0)
PH UR STRIP.AUTO: 5.5 [PH]
PLATELET # BLD AUTO: 211 X10*3/UL (ref 150–450)
PROT UR STRIP.AUTO-MCNC: NEGATIVE MG/DL
RBC # BLD AUTO: 4.39 X10*6/UL (ref 4–5.2)
RBC # UR STRIP.AUTO: NEGATIVE MG/DL
RSV RNA RESP QL NAA+PROBE: NOT DETECTED
SARS-COV-2 RNA RESP QL NAA+PROBE: NOT DETECTED
SP GR UR STRIP.AUTO: 1.02
UROBILINOGEN UR STRIP.AUTO-MCNC: NORMAL MG/DL
WBC # BLD AUTO: 10.6 X10*3/UL (ref 4.4–11.3)

## 2025-03-31 PROCEDURE — 2500000004 HC RX 250 GENERAL PHARMACY W/ HCPCS (ALT 636 FOR OP/ED)

## 2025-03-31 PROCEDURE — 84484 ASSAY OF TROPONIN QUANT: CPT

## 2025-03-31 PROCEDURE — 71045 X-RAY EXAM CHEST 1 VIEW: CPT

## 2025-03-31 PROCEDURE — 73564 X-RAY EXAM KNEE 4 OR MORE: CPT | Mod: LEFT SIDE | Performed by: RADIOLOGY

## 2025-03-31 PROCEDURE — 72125 CT NECK SPINE W/O DYE: CPT

## 2025-03-31 PROCEDURE — 93005 ELECTROCARDIOGRAM TRACING: CPT

## 2025-03-31 PROCEDURE — 90715 TDAP VACCINE 7 YRS/> IM: CPT

## 2025-03-31 PROCEDURE — 87637 SARSCOV2&INF A&B&RSV AMP PRB: CPT

## 2025-03-31 PROCEDURE — 83880 ASSAY OF NATRIURETIC PEPTIDE: CPT

## 2025-03-31 PROCEDURE — 70450 CT HEAD/BRAIN W/O DYE: CPT

## 2025-03-31 PROCEDURE — 99285 EMERGENCY DEPT VISIT HI MDM: CPT | Mod: 25

## 2025-03-31 PROCEDURE — 36415 COLL VENOUS BLD VENIPUNCTURE: CPT

## 2025-03-31 PROCEDURE — 81003 URINALYSIS AUTO W/O SCOPE: CPT

## 2025-03-31 PROCEDURE — 85025 COMPLETE CBC W/AUTO DIFF WBC: CPT

## 2025-03-31 PROCEDURE — 83735 ASSAY OF MAGNESIUM: CPT

## 2025-03-31 PROCEDURE — 90471 IMMUNIZATION ADMIN: CPT

## 2025-03-31 PROCEDURE — 71045 X-RAY EXAM CHEST 1 VIEW: CPT | Performed by: RADIOLOGY

## 2025-03-31 PROCEDURE — 73564 X-RAY EXAM KNEE 4 OR MORE: CPT | Mod: LT

## 2025-03-31 RX ADMIN — TETANUS TOXOID, REDUCED DIPHTHERIA TOXOID AND ACELLULAR PERTUSSIS VACCINE, ADSORBED 0.5 ML: 5; 2.5; 8; 8; 2.5 SUSPENSION INTRAMUSCULAR at 12:19

## 2025-03-31 ASSESSMENT — LIFESTYLE VARIABLES
EVER HAD A DRINK FIRST THING IN THE MORNING TO STEADY YOUR NERVES TO GET RID OF A HANGOVER: NO
EVER FELT BAD OR GUILTY ABOUT YOUR DRINKING: NO
HAVE YOU EVER FELT YOU SHOULD CUT DOWN ON YOUR DRINKING: NO
HAVE PEOPLE ANNOYED YOU BY CRITICIZING YOUR DRINKING: NO
TOTAL SCORE: 0

## 2025-03-31 NOTE — PROGRESS NOTES
Spiritual Care Visit  Spiritual Care Request    Reason for Visit:  Routine Visit: Introduction  Crisis Visit: ED     Request Received From:       Focus of Care:  Visited With: Patient and family together         Refer to :          Spiritual Care Assessment    Spiritual Assessment:                      Care Provided:  Intended Effects: Build relationship of care and support, Convey a calming presence, Demonstrate caring and concern, Promote sense of peace    Sense of Community and or Pentecostalism Affiliation:  Nondenominational   Values/Beliefs  Spiritual Requests During Hospitalization: Jailyn asked to be anointed today.     Addressed Needs/Concerns and/or Ami Through:     Sacramental Encounters  Sacrament of Sick-Anointing: Anointed    Outcome:        Advance Directives:         Spiritual Care Annotation    Annotation:  Jailyn asked to be anointed today.  Son=Matt Matos

## 2025-03-31 NOTE — DISCHARGE INSTRUCTIONS
You are seen today for a fall, your CT scans, x-rays and lab work are reassuring, please follow with your primary care provider, please return the ER for new or worsening symptoms.

## 2025-03-31 NOTE — ED PROVIDER NOTES
HPI   No chief complaint on file.      Patient is a 89-year-old female presenting with chief complaint of a fall.  Patient was getting out of her relatives car on the way to an appointment, and had a ground-level mechanical fall, EMS was called to transfer the patient, patient does have a history of dementia, she is complaining of left knee pain, does not believe she struck her head, but does have hematoma on head, patient has no other acute complaints at this time.      History provided by:  Patient, EMS personnel and relative  History limited by:  Dementia          Patient History   Past Medical History:   Diagnosis Date    Angina pectoris 11/13/2023    Anxiety     Messina's esophagus     CAD (coronary artery disease)     CHF (congestive heart failure)     Dementia     Disability of walking 10/17/2023    Dizziness 11/13/2023    Essential hypertension     GERD (gastroesophageal reflux disease)     Lightheadedness 11/13/2023    Lumbar radiculopathy 10/17/2023    Mixed hyperlipidemia 10/17/2023    Shortness of breath 11/13/2023     Past Surgical History:   Procedure Laterality Date    TOTAL HIP ARTHROPLASTY Right      No family history on file.  Social History     Tobacco Use    Smoking status: Never     Passive exposure: Never    Smokeless tobacco: Never   Vaping Use    Vaping status: Never Used   Substance Use Topics    Alcohol use: Never    Drug use: Never       Physical Exam   ED Triage Vitals   Temp Pulse Resp BP   -- -- -- --      SpO2 Temp src Heart Rate Source Patient Position   -- -- -- --      BP Location FiO2 (%)     -- --       Physical Exam  Vitals and nursing note reviewed. Exam conducted with a chaperone present.   Constitutional:       General: She is not in acute distress.     Appearance: Normal appearance. She is normal weight. She is diaphoretic. She is not ill-appearing or toxic-appearing.   HENT:      Head: Normocephalic. Abrasion present.      Right Ear: Tympanic membrane, ear canal and external  ear normal.      Left Ear: Tympanic membrane, ear canal and external ear normal.      Nose: Nose normal.      Mouth/Throat:      Mouth: Mucous membranes are moist.      Pharynx: Oropharynx is clear.   Eyes:      Extraocular Movements: Extraocular movements intact.      Conjunctiva/sclera: Conjunctivae normal.      Pupils: Pupils are equal, round, and reactive to light.   Cardiovascular:      Rate and Rhythm: Normal rate and regular rhythm.      Pulses: Normal pulses.      Heart sounds: Normal heart sounds.   Pulmonary:      Effort: Pulmonary effort is normal.      Breath sounds: Normal breath sounds.   Abdominal:      General: Abdomen is flat. Bowel sounds are normal.      Palpations: Abdomen is soft.   Musculoskeletal:         General: Normal range of motion.      Cervical back: Normal range of motion and neck supple. No tenderness.      Right knee: Normal.      Left knee: Ecchymosis present. Tenderness present.   Skin:     General: Skin is warm.      Capillary Refill: Capillary refill takes less than 2 seconds.   Neurological:      General: No focal deficit present.      Mental Status: She is alert. Mental status is at baseline.           ED Course & MDM   ED Course as of 04/02/25 2035   Mon Mar 31, 2025   1115 EKG interpreted by myself independently, EKG shows normal sinus rhythm, rate 82 bpm, SD interval 162, QRS 76, , QTc 443, patient has no ST elevation or depression, negative for acute MI. [KRISTOPHER]      ED Course User Index  [KRISTOPHER] Danish Lutz, DO         Diagnoses as of 04/02/25 2035   Accidental fall, initial encounter   Abrasion of scalp, initial encounter   Abrasion of left knee, initial encounter   Closed head injury, initial encounter                 No data recorded                                 Medical Decision Making  Patient seen and evaluated at bedside, patient is in no acute distress.  I will order a CBC, magnesium, BNP, COVID, flu, RSV, troponin, urinalysis, CT head, CT cervical spine, x-ray  chest, x-ray knee, EKG, give the patient a tetanus booster. Differential diagnosis includes but is not limited to accidental fall, intercranial hemorrhage, cervical spine fracture, knee dislocation, patellar injury.  Patient CBC shows hemoglobin 14.4, medic at 43.1, magnesium 2.51, COVID flu and RSV negative, spironolactone 12, urinalysis did not show signs of infection, CT cervical spine not show any acute findings, CT head showed no hemorrhage, x-ray chest not show any acute findings, x-ray knee shows arthritis, results discussed with the patient and her son, patient will be discharged back to her nursing facility, return precautions were given, they are agreeable to discharge plan.    Diagnosis: Accidental fall, knee abrasion, closed head injury  CT head wo IV contrast   Final Result    No evidence of acute cortical infarct or intracranial hemorrhage.          Small linear calcification on the surface of the right parietal gyrus    is unchanged from the prior study.          No evidence of intracranial hemorrhage or displaced skull fracture.          MACRO:    None.          Signed by: Aliza Oh 3/31/2025 12:30 PM    Dictation workstation:   [id number]     CT cervical spine wo IV contrast   Final Result    No fracture is identified          Degenerative listheses as noted with diffuse degenerative changes          MACRO:    None          Signed by: Aliza Oh 3/31/2025 12:37 PM    Dictation workstation:   [id number]     XR chest 1 view   Final Result    No evidence of acute intrathoracic abnormality.          Signed by: Indio Maradiaga 3/31/2025 11:55 AM    Dictation workstation:   [id number]     XR knee left 4+ views   Final Result    Advanced left knee osteoarthritis.          Signed by: Indio Maradiaga 3/31/2025 11:54 AM    Dictation workstation:   [id number]     Results for orders placed or performed during the hospital encounter of 03/31/25  -CBC and Auto Differential:   Collection Time: 03/31/25  11:05 AM       Result                      Value             Ref Range           WBC                         10.6              4.4 - 11.3 x*       nRBC                        0.0               0.0 - 0.0 /1*       RBC                         4.39              4.00 - 5.20 *       Hemoglobin                  14.4              12.0 - 16.0 *       Hematocrit                  43.1              36.0 - 46.0 %       MCV                         98                80 - 100 fL         MCH                         32.8              26.0 - 34.0 *       MCHC                        33.4              32.0 - 36.0 *       RDW                         14.1              11.5 - 14.5 %       Platelets                   211               150 - 450 x1*       Neutrophils %               63.0              40.0 - 80.0 %       Immature Granulocytes *     0.5               0.0 - 0.9 %         Lymphocytes %               23.8              13.0 - 44.0 %       Monocytes %                 10.7              2.0 - 10.0 %        Eosinophils %               1.6               0.0 - 6.0 %         Basophils %                 0.4               0.0 - 2.0 %         Neutrophils Absolute        6.65 (H)          1.60 - 5.50 *       Immature Granulocytes *     0.05              0.00 - 0.50 *       Lymphocytes Absolute        2.51              0.80 - 3.00 *       Monocytes Absolute          1.13 (H)          0.05 - 0.80 *       Eosinophils Absolute        0.17              0.00 - 0.40 *       Basophils Absolute          0.04              0.00 - 0.10 *  -Magnesium:   Collection Time: 03/31/25 11:05 AM       Result                      Value             Ref Range           Magnesium                   2.51 (H)          1.60 - 2.40 *  -B-Type Natriuretic Peptide:   Collection Time: 03/31/25 11:05 AM       Result                      Value             Ref Range           BNP                         44                0 - 99 pg/mL   -Sars-CoV-2, Influenza A/B and RSV PCR:    Collection Time: 03/31/25 11:05 AM       Result                      Value             Ref Range           Coronavirus 2019, PCR       Not Detected      Not Detected        Flu A Result                Not Detected      Not Detected        Flu B Result                Not Detected      Not Detected        RSV PCR                     Not Detected      Not Detected   -Troponin I, High Sensitivity, Initial:   Collection Time: 03/31/25 11:05 AM       Result                      Value             Ref Range           Troponin I, High Sensi*     11                0 - 13 ng/L    -ECG 12 lead:   Collection Time: 03/31/25 11:13 AM       Result                      Value             Ref Range           Ventricular Rate            82                BPM                 Atrial Rate                 82                BPM                 MN Interval                 162               ms                  QRS Duration                76                ms                  QT Interval                 380               ms                  QTC Calculation(Bazett)     443               ms                  P Axis                      69                degrees             R Axis                      -24               degrees             T Axis                      40                degrees             QRS Count                   13                beats               Q Onset                     214               ms                  P Onset                     133               ms                  P Offset                    173               ms                  T Offset                    404               ms                  QTC Fredericia              421               ms             -Troponin, High Sensitivity, 1 Hour:   Collection Time: 03/31/25 12:11 PM       Result                      Value             Ref Range           Troponin I, High Sensi*     12                0 - 13 ng/L    -Urinalysis with Reflex Culture and Microscopic:    Collection Time: 03/31/25  1:53 PM       Result                      Value             Ref Range           Color, Urine                Light-Yellow      Light-Yellow*       Appearance, Urine           Clear             Clear               Specific Gravity, Urine     1.016             1.005 - 1.035       pH, Urine                   5.5               5.0, 5.5, 6.*       Protein, Urine              NEGATIVE          NEGATIVE, 10*       Glucose, Urine              150 (2+) (A)      Normal mg/dL        Blood, Urine                NEGATIVE          NEGATIVE mg/*       Ketones, Urine              NEGATIVE          NEGATIVE mg/*       Bilirubin, Urine            NEGATIVE          NEGATIVE mg/*       Urobilinogen, Urine         Normal            Normal mg/dL        Nitrite, Urine              NEGATIVE          NEGATIVE            Leukocyte Esterase, Ur*     NEGATIVE          NEGATIVE       -Extra Urine Gray Tube:   Collection Time: 03/31/25  1:53 PM       Result                      Value             Ref Range           Extra Tube                                                    Hold for add-ons.    *Note: Due to a large number of results and/or encounters for the requested time period, some results have not been displayed. A complete set of results can be found in Results Review.  .        Procedure  Procedures  Sections of this report were created using voice-to-text technology and may contain errors in translation    Danish Lutz DO  Emergency Medicine         Danish Lutz DO  04/02/25 1293

## 2025-04-01 ENCOUNTER — NURSING HOME VISIT (OUTPATIENT)
Dept: POST ACUTE CARE | Facility: EXTERNAL LOCATION | Age: OVER 89
End: 2025-04-01
Payer: MEDICARE

## 2025-04-01 DIAGNOSIS — S92.425A NONDISPLACED FRACTURE OF DISTAL PHALANX OF LEFT GREAT TOE, INITIAL ENCOUNTER FOR CLOSED FRACTURE: Primary | ICD-10-CM

## 2025-04-01 DIAGNOSIS — F03.90 DEMENTIA WITHOUT BEHAVIORAL DISTURBANCE (MULTI): ICD-10-CM

## 2025-04-01 DIAGNOSIS — I10 ESSENTIAL HYPERTENSION: ICD-10-CM

## 2025-04-01 LAB
ATRIAL RATE: 82 BPM
HOLD SPECIMEN: NORMAL
P AXIS: 69 DEGREES
P OFFSET: 173 MS
P ONSET: 133 MS
PR INTERVAL: 162 MS
Q ONSET: 214 MS
QRS COUNT: 13 BEATS
QRS DURATION: 76 MS
QT INTERVAL: 380 MS
QTC CALCULATION(BAZETT): 443 MS
QTC FREDERICIA: 421 MS
R AXIS: -24 DEGREES
T AXIS: 40 DEGREES
T OFFSET: 404 MS
VENTRICULAR RATE: 82 BPM

## 2025-04-01 PROCEDURE — 99309 SBSQ NF CARE MODERATE MDM 30: CPT | Performed by: INTERNAL MEDICINE

## 2025-04-01 NOTE — LETTER
Patient: Jailyn Dai  : 1930    Encounter Date: 2025    Subjective  Patient ID: Jailyn Dai is a 89 y.o. female who is long term resident being seen and evaluated for multiple medical problems.    Patient is alert, pleasant, forgetful.  She is complaining of left foot pain, she had a fall while out with son and sustained a left great toe fracture and bruised her forehead.  She was evaluated in ER and CT head, cervical spine were unremarkable.  She ambulates short distances with walker .  Recent labs showed an elevated TSH, levothyroxine dose was increased.  She is compliant with medications         Review of Systems   Constitutional:  Negative for fever and unexpected weight change.   HENT:  Negative for sore throat.    Respiratory:  Negative for shortness of breath.    Cardiovascular:  Negative for chest pain and palpitations.   Gastrointestinal:  Negative for abdominal pain, constipation, nausea and vomiting.   Genitourinary:  Negative for difficulty urinating and frequency.   Musculoskeletal:  Positive for arthralgias. Negative for back pain.   Skin:  Negative for rash.   Neurological:  Negative for dizziness, seizures and headaches.   Psychiatric/Behavioral:  Negative for agitation and confusion. The patient is nervous/anxious.        Objective  LMP  (LMP Unknown)     Physical Exam  Vitals reviewed.   Constitutional:       General: She is not in acute distress.  HENT:      Mouth/Throat:      Mouth: Mucous membranes are moist.   Cardiovascular:      Rate and Rhythm: Normal rate and regular rhythm.      Heart sounds: Normal heart sounds.   Pulmonary:      Breath sounds: Normal breath sounds. No rales.   Abdominal:      General: Bowel sounds are normal.      Palpations: Abdomen is soft.      Tenderness: There is no abdominal tenderness.   Musculoskeletal:         General: No swelling or tenderness.      Cervical back: Neck supple. No tenderness.      Right lower leg: Edema present.      Left lower leg:  Edema present.   Skin:     General: Skin is warm.      Comments: Ecchymosis of forehead, tender to palpation   Neurological:      General: No focal deficit present.      Mental Status: She is alert.         Assessment/Plan  Problem List Items Addressed This Visit       Dementia without behavioral disturbance (Multi)    Essential hypertension     Other Visit Diagnoses       Nondisplaced fracture of distal phalanx of left great toe, initial encounter for closed fracture    -  Primary                                           Goals    None     Will immobilize left foot in ortho shoe, continue fall precautions, psychiatric follow up, continue supportive care.        Electronically Signed By: Swetha Garcia MD   4/13/25 10:21 AM

## 2025-04-07 ASSESSMENT — ENCOUNTER SYMPTOMS
FREQUENCY: 0
SEIZURES: 0
FEVER: 0
VOMITING: 0
UNEXPECTED WEIGHT CHANGE: 0
HEADACHES: 0
CONSTIPATION: 0
SHORTNESS OF BREATH: 0
DIZZINESS: 0
AGITATION: 0
SORE THROAT: 0
PALPITATIONS: 0
NAUSEA: 0
DIFFICULTY URINATING: 0
BACK PAIN: 0
ARTHRALGIAS: 1
NERVOUS/ANXIOUS: 1
ABDOMINAL PAIN: 0
CONFUSION: 0

## 2025-04-07 NOTE — PROGRESS NOTES
Subjective   Patient ID: Jailyn Dai is a 89 y.o. female who is long term resident being seen and evaluated for multiple medical problems.    Patient is alert, pleasant, forgetful.  She has a productive cough, denies shortness of breath.   Her appetite is ok. .  She had no recent falls. She ambulates short distances with walker had no recent falls.          Review of Systems   Constitutional:  Negative for fever and unexpected weight change.   HENT:  Negative for sore throat.    Respiratory:  Negative for shortness of breath.    Cardiovascular:  Negative for chest pain and palpitations.   Gastrointestinal:  Negative for abdominal pain, constipation, nausea and vomiting.   Genitourinary:  Negative for difficulty urinating and frequency.   Musculoskeletal:  Positive for arthralgias. Negative for back pain.   Skin:  Negative for rash.   Neurological:  Negative for dizziness, seizures and headaches.   Psychiatric/Behavioral:  Negative for agitation and confusion. The patient is nervous/anxious.        Objective   LMP  (LMP Unknown)     Physical Exam  Vitals reviewed.   Constitutional:       General: She is not in acute distress.  HENT:      Mouth/Throat:      Mouth: Mucous membranes are moist.   Cardiovascular:      Rate and Rhythm: Normal rate and regular rhythm.      Heart sounds: Normal heart sounds.   Pulmonary:      Breath sounds: Normal breath sounds. No rales.   Abdominal:      General: Bowel sounds are normal.      Palpations: Abdomen is soft.      Tenderness: There is no abdominal tenderness.   Musculoskeletal:         General: No swelling or tenderness.      Cervical back: Neck supple. No tenderness.      Right lower leg: Edema present.      Left lower leg: Edema present.   Skin:     General: Skin is warm.   Neurological:      General: No focal deficit present.      Mental Status: She is alert.         Assessment/Plan   Problem List Items Addressed This Visit       Dementia without behavioral disturbance  (Multi) - Primary    Essential hypertension    Osteoarthritis                                    Goals    None     Will  continue fall precautions, psychiatric follow up, continue supportive care.

## 2025-04-13 ASSESSMENT — ENCOUNTER SYMPTOMS
BACK PAIN: 0
HEADACHES: 0
CONFUSION: 0
FREQUENCY: 0
SHORTNESS OF BREATH: 0
DIFFICULTY URINATING: 0
AGITATION: 0
PALPITATIONS: 0
DIZZINESS: 0
ABDOMINAL PAIN: 0
SEIZURES: 0
CONSTIPATION: 0
VOMITING: 0
ARTHRALGIAS: 1
NAUSEA: 0
NERVOUS/ANXIOUS: 1
UNEXPECTED WEIGHT CHANGE: 0
FEVER: 0
SORE THROAT: 0

## 2025-04-13 NOTE — PROGRESS NOTES
Subjective   Patient ID: Jailyn Dai is a 89 y.o. female who is long term resident being seen and evaluated for multiple medical problems.    Patient is alert, pleasant, forgetful.  She is complaining of left foot pain, she had a fall while out with son and sustained a left great toe fracture and bruised her forehead.  She was evaluated in ER and CT head, cervical spine were unremarkable.  She ambulates short distances with walker .  Recent labs showed an elevated TSH, levothyroxine dose was increased.  She is compliant with medications         Review of Systems   Constitutional:  Negative for fever and unexpected weight change.   HENT:  Negative for sore throat.    Respiratory:  Negative for shortness of breath.    Cardiovascular:  Negative for chest pain and palpitations.   Gastrointestinal:  Negative for abdominal pain, constipation, nausea and vomiting.   Genitourinary:  Negative for difficulty urinating and frequency.   Musculoskeletal:  Positive for arthralgias. Negative for back pain.   Skin:  Negative for rash.   Neurological:  Negative for dizziness, seizures and headaches.   Psychiatric/Behavioral:  Negative for agitation and confusion. The patient is nervous/anxious.        Objective   LMP  (LMP Unknown)     Physical Exam  Vitals reviewed.   Constitutional:       General: She is not in acute distress.  HENT:      Mouth/Throat:      Mouth: Mucous membranes are moist.   Cardiovascular:      Rate and Rhythm: Normal rate and regular rhythm.      Heart sounds: Normal heart sounds.   Pulmonary:      Breath sounds: Normal breath sounds. No rales.   Abdominal:      General: Bowel sounds are normal.      Palpations: Abdomen is soft.      Tenderness: There is no abdominal tenderness.   Musculoskeletal:         General: No swelling or tenderness.      Cervical back: Neck supple. No tenderness.      Right lower leg: Edema present.      Left lower leg: Edema present.   Skin:     General: Skin is warm.      Comments:  Ecchymosis of forehead, tender to palpation   Neurological:      General: No focal deficit present.      Mental Status: She is alert.         Assessment/Plan   Problem List Items Addressed This Visit       Dementia without behavioral disturbance (Multi)    Essential hypertension     Other Visit Diagnoses       Nondisplaced fracture of distal phalanx of left great toe, initial encounter for closed fracture    -  Primary                                           Goals    None     Will immobilize left foot in ortho shoe, continue fall precautions, psychiatric follow up, continue supportive care.

## 2025-05-06 ENCOUNTER — NURSING HOME VISIT (OUTPATIENT)
Dept: POST ACUTE CARE | Facility: EXTERNAL LOCATION | Age: OVER 89
End: 2025-05-06
Payer: MEDICARE

## 2025-05-06 DIAGNOSIS — E66.01 OBESITY, MORBID (MULTI): ICD-10-CM

## 2025-05-06 DIAGNOSIS — E78.2 MIXED HYPERLIPIDEMIA: ICD-10-CM

## 2025-05-06 DIAGNOSIS — E03.9 HYPOTHYROIDISM (ACQUIRED): Primary | ICD-10-CM

## 2025-05-06 DIAGNOSIS — M15.0 PRIMARY OSTEOARTHRITIS INVOLVING MULTIPLE JOINTS: ICD-10-CM

## 2025-05-06 PROCEDURE — 99308 SBSQ NF CARE LOW MDM 20: CPT | Performed by: INTERNAL MEDICINE

## 2025-05-06 NOTE — LETTER
Patient: Jailyn Dai  : 1930    Encounter Date: 2025    Subjective  Patient ID: Jailyn Dai is a 89 y.o. female who is long term resident being seen and evaluated for multiple medical problems.    Patient is alert, pleasant, forgetful.  She is complaining of hip pain.  She ambulates short distances with walker .  Recent labs showed an elevated TSH, levothyroxine dose was increased.  She is compliant with medications         Review of Systems   Constitutional:  Negative for fever and unexpected weight change.   HENT:  Negative for sore throat.    Respiratory:  Negative for shortness of breath.    Cardiovascular:  Negative for chest pain and palpitations.   Gastrointestinal:  Negative for abdominal pain, constipation, nausea and vomiting.   Genitourinary:  Negative for difficulty urinating and frequency.   Musculoskeletal:  Positive for arthralgias. Negative for back pain.   Skin:  Negative for rash.   Neurological:  Negative for dizziness, seizures and headaches.   Psychiatric/Behavioral:  Negative for agitation and confusion. The patient is nervous/anxious.        Objective  LMP  (LMP Unknown)     Physical Exam  Vitals reviewed.   Constitutional:       General: She is not in acute distress.  HENT:      Mouth/Throat:      Mouth: Mucous membranes are moist.   Cardiovascular:      Rate and Rhythm: Normal rate and regular rhythm.      Heart sounds: Normal heart sounds.   Pulmonary:      Breath sounds: Normal breath sounds. No rales.   Abdominal:      General: Bowel sounds are normal.      Palpations: Abdomen is soft.      Tenderness: There is no abdominal tenderness.   Musculoskeletal:         General: No swelling or tenderness.      Cervical back: Neck supple. No tenderness.      Right lower leg: Edema present.      Left lower leg: Edema present.   Skin:     General: Skin is warm.   Neurological:      General: No focal deficit present.      Mental Status: She is alert.         Assessment/Plan  Problem List  Items Addressed This Visit       Hypothyroidism (acquired) - Primary    Osteoarthritis     Other Visit Diagnoses         Mixed hyperlipidemia                                                 Goals    None     Will  continue fall precautions, psychiatric follow up, continue supportive care.      Electronically Signed By: Swetha Garcia MD   6/19/25 11:11 AM

## 2025-06-19 PROBLEM — E66.01 OBESITY, MORBID (MULTI): Status: ACTIVE | Noted: 2023-11-13

## 2025-06-19 ASSESSMENT — ENCOUNTER SYMPTOMS
FEVER: 0
AGITATION: 0
NERVOUS/ANXIOUS: 1
CONSTIPATION: 0
SORE THROAT: 0
PALPITATIONS: 0
FREQUENCY: 0
DIFFICULTY URINATING: 0
BACK PAIN: 0
SEIZURES: 0
HEADACHES: 0
CONFUSION: 0
ARTHRALGIAS: 1
NAUSEA: 0
SHORTNESS OF BREATH: 0
ABDOMINAL PAIN: 0
DIZZINESS: 0
VOMITING: 0
UNEXPECTED WEIGHT CHANGE: 0

## 2025-06-19 NOTE — PROGRESS NOTES
Subjective   Patient ID: Jailyn Dai is a 89 y.o. female who is long term resident being seen and evaluated for multiple medical problems.    Patient is alert, pleasant, forgetful.  She is complaining of hip pain.  She ambulates short distances with walker .  Recent labs showed an elevated TSH, levothyroxine dose was increased.  She is compliant with medications         Review of Systems   Constitutional:  Negative for fever and unexpected weight change.   HENT:  Negative for sore throat.    Respiratory:  Negative for shortness of breath.    Cardiovascular:  Negative for chest pain and palpitations.   Gastrointestinal:  Negative for abdominal pain, constipation, nausea and vomiting.   Genitourinary:  Negative for difficulty urinating and frequency.   Musculoskeletal:  Positive for arthralgias. Negative for back pain.   Skin:  Negative for rash.   Neurological:  Negative for dizziness, seizures and headaches.   Psychiatric/Behavioral:  Negative for agitation and confusion. The patient is nervous/anxious.        Objective   LMP  (LMP Unknown)     Physical Exam  Vitals reviewed.   Constitutional:       General: She is not in acute distress.  HENT:      Mouth/Throat:      Mouth: Mucous membranes are moist.   Cardiovascular:      Rate and Rhythm: Normal rate and regular rhythm.      Heart sounds: Normal heart sounds.   Pulmonary:      Breath sounds: Normal breath sounds. No rales.   Abdominal:      General: Bowel sounds are normal.      Palpations: Abdomen is soft.      Tenderness: There is no abdominal tenderness.   Musculoskeletal:         General: No swelling or tenderness.      Cervical back: Neck supple. No tenderness.      Right lower leg: Edema present.      Left lower leg: Edema present.   Skin:     General: Skin is warm.   Neurological:      General: No focal deficit present.      Mental Status: She is alert.         Assessment/Plan   Problem List Items Addressed This Visit       Hypothyroidism (acquired) -  Primary    Osteoarthritis     Other Visit Diagnoses         Mixed hyperlipidemia                                                 Goals    None     Will  continue fall precautions, psychiatric follow up, continue supportive care.     Detail Level: Detailed

## 2025-06-22 DIAGNOSIS — M87.052 AVASCULAR NECROSIS OF BONE OF LEFT HIP (MULTI): Primary | ICD-10-CM

## 2025-06-22 RX ORDER — TRAMADOL HYDROCHLORIDE 50 MG/1
50 TABLET, FILM COATED ORAL 2 TIMES DAILY
Qty: 60 TABLET | Refills: 5 | Status: SHIPPED | OUTPATIENT
Start: 2025-06-22 | End: 2025-12-19

## 2025-07-17 ENCOUNTER — APPOINTMENT (OUTPATIENT)
Age: OVER 89
End: 2025-07-17
Payer: MEDICARE

## 2025-07-17 VITALS
HEIGHT: 62 IN | WEIGHT: 195 LBS | DIASTOLIC BLOOD PRESSURE: 64 MMHG | HEART RATE: 61 BPM | RESPIRATION RATE: 16 BRPM | SYSTOLIC BLOOD PRESSURE: 132 MMHG | OXYGEN SATURATION: 95 % | BODY MASS INDEX: 35.88 KG/M2

## 2025-07-17 DIAGNOSIS — I48.4 ATYPICAL ATRIAL FLUTTER: ICD-10-CM

## 2025-07-17 DIAGNOSIS — I25.10 ATHEROSCLEROSIS OF NATIVE CORONARY ARTERY OF NATIVE HEART WITHOUT ANGINA PECTORIS: Primary | ICD-10-CM

## 2025-07-17 DIAGNOSIS — Z86.79 HISTORY OF ATRIAL FIBRILLATION: ICD-10-CM

## 2025-07-17 DIAGNOSIS — R07.89 CHEST DISCOMFORT: ICD-10-CM

## 2025-07-17 DIAGNOSIS — I10 ESSENTIAL HYPERTENSION: ICD-10-CM

## 2025-07-17 DIAGNOSIS — I50.32 CHRONIC HEART FAILURE WITH PRESERVED EJECTION FRACTION (HFPEF): ICD-10-CM

## 2025-07-17 PROCEDURE — 1126F AMNT PAIN NOTED NONE PRSNT: CPT | Performed by: INTERNAL MEDICINE

## 2025-07-17 PROCEDURE — 1036F TOBACCO NON-USER: CPT | Performed by: INTERNAL MEDICINE

## 2025-07-17 PROCEDURE — 1160F RVW MEDS BY RX/DR IN RCRD: CPT | Performed by: INTERNAL MEDICINE

## 2025-07-17 PROCEDURE — 3078F DIAST BP <80 MM HG: CPT | Performed by: INTERNAL MEDICINE

## 2025-07-17 PROCEDURE — 3075F SYST BP GE 130 - 139MM HG: CPT | Performed by: INTERNAL MEDICINE

## 2025-07-17 PROCEDURE — 99214 OFFICE O/P EST MOD 30 MIN: CPT | Performed by: INTERNAL MEDICINE

## 2025-07-17 PROCEDURE — 1159F MED LIST DOCD IN RCRD: CPT | Performed by: INTERNAL MEDICINE

## 2025-07-17 RX ORDER — TORSEMIDE 100 MG/1
100 TABLET ORAL DAILY
COMMUNITY

## 2025-07-17 RX ORDER — NITROGLYCERIN 0.4 MG/1
0.4 TABLET SUBLINGUAL EVERY 5 MIN PRN
COMMUNITY

## 2025-07-17 ASSESSMENT — ENCOUNTER SYMPTOMS
DEPRESSION: 0
OCCASIONAL FEELINGS OF UNSTEADINESS: 0
LOSS OF SENSATION IN FEET: 0

## 2025-07-17 ASSESSMENT — LIFESTYLE VARIABLES
AUDIT-C TOTAL SCORE: 0
SKIP TO QUESTIONS 9-10: 1
HOW OFTEN DO YOU HAVE SIX OR MORE DRINKS ON ONE OCCASION: NEVER
HOW MANY STANDARD DRINKS CONTAINING ALCOHOL DO YOU HAVE ON A TYPICAL DAY: PATIENT DOES NOT DRINK
AUDIT TOTAL SCORE: 0
HAVE YOU OR SOMEONE ELSE BEEN INJURED AS A RESULT OF YOUR DRINKING: NO
HAS A RELATIVE, FRIEND, DOCTOR, OR ANOTHER HEALTH PROFESSIONAL EXPRESSED CONCERN ABOUT YOUR DRINKING OR SUGGESTED YOU CUT DOWN: NO
HOW OFTEN DO YOU HAVE A DRINK CONTAINING ALCOHOL: NEVER

## 2025-07-17 ASSESSMENT — PAIN SCALES - GENERAL: PAINLEVEL_OUTOF10: 0-NO PAIN

## 2025-07-17 NOTE — PROGRESS NOTES
[facility]    Interventional Cardiology    History of present illness:  This is a very pleasant 89-year-old functional female patient he follow-up in my office on history of heart failure diastolic dysfunction. had a stress test last year showed no ischemia ejection fraction preserved on the 2 D echo 4/2024.  Patient had mechanical fall in October 2024.  She was treated at [facility].  Had head CT showing small subarachnoid hemorrhage treated medically.  She has been also complaining of chronic cough mildly productive associated with retrosternal chest pain.  The pain is reproducible to palpation and with cough.  Saw Dr. Lassiter and put her on inhalers with significant improvement of the cough.  Patient has been having significant issues with the lower extremity edema.  Specially the right foot more than the left.  She is currently asked [facility].  Patient has been without wheelchair or several months.  Denies having shortness of breath.  She still has productive cough occasionally which has been feeling better with inhalers and occasional episodes of atypical chest pain.  No orthopnea or PND.  No palpitations or syncope.    Medical History[1]    Surgical History[2]    Allergies[3]     reports that she has never smoked. She has never been exposed to tobacco smoke. She has never used smokeless tobacco. She reports that she does not drink alcohol and does not use drugs.    Family History[4]    Patient's Medications   New Prescriptions    No medications on file   Previous Medications    ACETAMINOPHEN (TYLENOL) 500 MG TABLET    Take by mouth.    ALBUTEROL 90 MCG/ACTUATION INHALER        ASPIRIN (CALLY LOW DOSE ASPIRIN) 81 MG EC TABLET    Take 1 tablet (81 mg) by mouth once daily.    CALCIUM CARBONATE-VITAMIN D3 ORAL    Take by mouth.    CHOLECALCIFEROL (VITAMIN D-3) 25 MCG (1000 UT) CAPSULE    Take 2 capsules (50 mcg) by mouth once daily.     CYANOCOBALAMIN (VITAMIN B-12) 1,000 MCG TABLET    Take 1 tablet (1,000 mcg) by mouth once daily.    DAPAGLIFLOZIN PROPANEDIOL (FARXIGA) 10 MG    Take 1 tablet (10 mg) by mouth once daily.    DICLOFENAC SODIUM (VOLTAREN) 1 % GEL        DOCUSATE SODIUM (COLACE) 100 MG CAPSULE    Take 1 capsule (100 mg) by mouth once daily.    DULOXETINE (CYMBALTA) 20 MG DR CAPSULE    Take 1 capsule (20 mg) by mouth once daily.    FLUTICASONE (FLONASE) 50 MCG/ACTUATION NASAL SPRAY        FLUTICASONE-UMECLIDIN-VILANTER (TRELEGY-ELLIPTA) 100-62.5-25 MCG BLISTER WITH DEVICE    Inhale 1 puff once daily.    FUROSEMIDE (LASIX) 40 MG TABLET    Take 2 tablets (80 mg) by mouth once every 24 hours.    ISOSORBIDE MONONITRATE ER (IMDUR) 30 MG 24 HR TABLET    Take 1 tablet (30 mg) by mouth once every 24 hours.    LEVOTHYROXINE (EUTHYROX) 50 MCG TABLET    Take 1 tablet (50 mcg) by mouth once every 24 hours.    LOSARTAN (COZAAR) 50 MG TABLET    Take 1 tablet (50 mg) by mouth once every 24 hours.    NITROGLYCERIN (NITROSTAT) 0.4 MG SL TABLET    Place 1 tablet (0.4 mg) under the tongue every 5 minutes if needed for chest pain.    OMEPRAZOLE (PRILOSEC) 40 MG DR CAPSULE    Take 1 capsule (40 mg) by mouth once daily in the morning. Take before meals.    PRAVASTATIN (PRAVACHOL) 40 MG TABLET    Take 1 tablet (40 mg) by mouth once every 24 hours.    SPIRONOLACTONE (ALDACTONE) 25 MG TABLET    Take 1 tablet (25 mg) by mouth once every 24 hours.    TORSEMIDE (DEMADEX) 100 MG TABLET    Take 1 tablet (100 mg) by mouth once daily.    TRAMADOL (ULTRAM) 50 MG TABLET    Take 1 tablet (50 mg) by mouth 2 times a day.    VIT A/VIT C/VIT E/ZINC/COPPER (ICAPS AREDS ORAL)    Take 1 capsule by mouth once daily.    VITAMIN E MIXED 400 UNIT TABLET    Take 1 tablet by mouth once daily.   Modified Medications    No medications on file   Discontinued Medications    No medications on file       Objective   Physical Exam  General: Patient in no acute distress   HEENT: Atraumatic  normocephalic.  Neck: Supple, jugular venous pressure within normal limit.  No bruits  Lungs: Clear to auscultation bilaterally  Cardiovascular: Regular rate and rhythm, normal heart sounds, no murmurs rubs or gallops  Abdomen: Soft nontender nondistended.  Normal bowel sounds.  Extremities: Warm to touch, no edema.    Lab Review   No visits with results within 2 Month(s) from this visit.   Latest known visit with results is:   Admission on 03/31/2025, Discharged on 03/31/2025   Component Date Value    Ventricular Rate 03/31/2025 82     Atrial Rate 03/31/2025 82     NM Interval 03/31/2025 162     QRS Duration 03/31/2025 76     QT Interval 03/31/2025 380     QTC Calculation(Bazett) 03/31/2025 443     P Axis 03/31/2025 69     R [place] 03/31/2025 -24     T Axis 03/31/2025 40     QRS Count 03/31/2025 13     Q Onset 03/31/2025 214     P Onset 03/31/2025 133     P Offset 03/31/2025 173     T Offset 03/31/2025 404     QTC Fredericia 03/31/2025 421     WBC 03/31/2025 10.6     nRBC 03/31/2025 0.0     RBC 03/31/2025 4.39     Hemoglobin 03/31/2025 14.4     Hematocrit 03/31/2025 43.1     MCV 03/31/2025 98     MCH 03/31/2025 32.8     MCHC 03/31/2025 33.4     RDW 03/31/2025 14.1     Platelets 03/31/2025 211     Neutrophils % 03/31/2025 63.0     Immature Granulocytes %,* 03/31/2025 0.5     Lymphocytes % 03/31/2025 23.8     Monocytes % 03/31/2025 10.7     Eosinophils % 03/31/2025 1.6     Basophils % 03/31/2025 0.4     Neutrophils Absolute 03/31/2025 6.65 (H)     Immature Granulocytes Ab* 03/31/2025 0.05     Lymphocytes Absolute 03/31/2025 2.51     Monocytes Absolute 03/31/2025 1.13 (H)     Eosinophils Absolute 03/31/2025 0.17     Basophils Absolute 03/31/2025 0.04     Magnesium 03/31/2025 2.51 (H)     BNP 03/31/2025 44     Coronavirus 2019, PCR 03/31/2025 Not Detected     Flu A Result 03/31/2025 Not Detected     Flu B Result 03/31/2025 Not Detected     RSV PCR 03/31/2025 Not Detected     Color, Urine 03/31/2025 Light-Yellow      Appearance, Urine 03/31/2025 Clear     Specific Gravity, Urine 03/31/2025 1.016     pH, Urine 03/31/2025 5.5     Protein, Urine 03/31/2025 NEGATIVE     Glucose, Urine 03/31/2025 150 (2+) (A)     Blood, Urine 03/31/2025 NEGATIVE     Ketones, Urine 03/31/2025 NEGATIVE     Bilirubin, Urine 03/31/2025 NEGATIVE     Urobilinogen, Urine 03/31/2025 Normal     Nitrite, Urine 03/31/2025 NEGATIVE     Leukocyte Esterase, Urine 03/31/2025 NEGATIVE     Extra Tube 03/31/2025 Hold for add-ons.     Troponin I, High Sensiti* 03/31/2025 11     Troponin I, High Sensiti* 03/31/2025 12         Assessment/Plan   Problem List[5]      This is a very pleasant 89-year-old functional female patient he follow-up in my office on history of heart failure diastolic dysfunction. had a stress test last year showed no ischemia ejection fraction preserved on the 2 D echo 4/2024.  Patient had mechanical fall in October 2024.  She was treated at [facility].  Had head CT showing small subarachnoid hemorrhage treated medically.  She has been also complaining of chronic cough mildly productive associated with retrosternal chest pain.  The pain is reproducible to palpation and with cough.  Saw Dr. Lassiter and put her on inhalers with significant improvement of the cough.  Patient has been having significant issues with the lower extremity edema.  Specially the right foot more than the left.  She is currently asked [facility].  Patient has been without wheelchair or several months.  Denies having shortness of breath.  She still has productive cough occasionally which has been feeling better with inhalers and occasional episodes of atypical chest pain.  No orthopnea or PND.  No palpitations or syncope.    Definitely her lower extremity edema is related to venous stasis and lack of ambulation.  Agree with is ACE wraps to help with the edema.  Also she has been on torsemide 100 mg oral daily spironolactone.  Her last creatinine jumped to  1.8.  When I reviewed her medications she is on ibuprofen 400 mg twice daily for chronic pain I would like to avoid NSAIDs in her case that may lead to worsening fluid retention and renal dysfunction.  Attempt to control her pain with Tylenol and tramadol.  But otherwise she is on optimal therapy.  Blood pressure and heart rate well-controlled.  May continue torsemide 100 mg oral daily or maybe 50 daily depending on her edema.  Recommend to raise her legs twice a day and bed under 2 pillows for 10 minutes.  Will follow-up in 6 months.      Cassandra Camarillo MD              [1]   Past Medical History:  Diagnosis Date    Angina pectoris 11/13/2023    Anxiety     Messina's esophagus     CAD (coronary artery disease)     CHF (congestive heart failure)     Dementia     Disability of walking 10/17/2023    Dizziness 11/13/2023    Essential hypertension     GERD (gastroesophageal reflux disease)     Lightheadedness 11/13/2023    Lumbar radiculopathy 10/17/2023    Mixed hyperlipidemia 10/17/2023    Shortness of breath 11/13/2023   [2]   Past Surgical History:  Procedure Laterality Date    TOTAL HIP ARTHROPLASTY Right    [3]   Allergies  Allergen Reactions    Meclizine Hcl Dizziness   [4] No family history on file.  [5]   Patient Active Problem List  Diagnosis    Anxiety    Atherosclerotic heart disease of native coronary artery without angina pectoris    Avascular necrosis of bone of left hip (Multi)    Messina's esophagus    Chronic heart failure with preserved ejection fraction (HFpEF)    Dementia without behavioral disturbance (Multi)    Essential hypertension    Gastroesophageal reflux disease without esophagitis    History of atrial fibrillation    Hypothyroidism (acquired)    Osteoarthritis    Benign neoplasm of large bowel    Dizziness    Contusion of hand    Dislocation of shoulder joint    Fall    Frequent falls    Hyponatremia    Internal hemorrhoids    Lower leg edema    Nausea & vomiting    Obesity, morbid (Multi)     Primary osteoarthritis of both hips    Physical debility    Vitamin B12 deficiency    History of colonic polyps    Hip pain    Edema of both lower extremities    Chest discomfort    Cardiac arrhythmia

## 2026-01-15 ENCOUNTER — APPOINTMENT (OUTPATIENT)
Age: OVER 89
End: 2026-01-15
Payer: MEDICARE